# Patient Record
Sex: FEMALE | Race: WHITE | Employment: OTHER | URBAN - METROPOLITAN AREA
[De-identification: names, ages, dates, MRNs, and addresses within clinical notes are randomized per-mention and may not be internally consistent; named-entity substitution may affect disease eponyms.]

---

## 2017-02-14 ENCOUNTER — HOSPITAL ENCOUNTER (OUTPATIENT)
Dept: LAB | Age: 72
Discharge: HOME | End: 2017-02-14
Payer: COMMERCIAL

## 2017-02-14 DIAGNOSIS — J45.909 UNCOMPLICATED ASTHMA, UNSPECIFIED ASTHMA SEVERITY: Chronic | ICD-10-CM

## 2017-02-14 DIAGNOSIS — R06.09 DYSPNEA ON EXERTION: ICD-10-CM

## 2017-02-14 DIAGNOSIS — R93.89 ABNORMAL CXR: ICD-10-CM

## 2017-02-14 LAB
BASOPHILS # BLD AUTO: 0 K/UL (ref 0–0.2)
BASOPHILS # BLD: 1 % (ref 0–2)
BNP SERPL-MCNC: 24 PG/ML
D DIMER PPP FEU-MCNC: 0.45 UG/ML(FEU)
DIFFERENTIAL METHOD BLD: ABNORMAL
EOSINOPHIL # BLD: 0.6 K/UL (ref 0–0.8)
EOSINOPHIL NFR BLD: 9 % (ref 0.5–7.8)
ERYTHROCYTE [DISTWIDTH] IN BLOOD BY AUTOMATED COUNT: 13.4 % (ref 11.9–14.6)
HCT VFR BLD AUTO: 40.9 % (ref 35.8–46.3)
HGB BLD-MCNC: 13.3 G/DL (ref 11.7–15.4)
IMM GRANULOCYTES # BLD: 0 K/UL (ref 0–0.5)
IMM GRANULOCYTES NFR BLD AUTO: 0.2 % (ref 0–5)
LDH SERPL L TO P-CCNC: 233 U/L (ref 110–210)
LYMPHOCYTES # BLD AUTO: 31 % (ref 13–44)
LYMPHOCYTES # BLD: 1.9 K/UL (ref 0.5–4.6)
MCH RBC QN AUTO: 28.1 PG (ref 26.1–32.9)
MCHC RBC AUTO-ENTMCNC: 32.5 G/DL (ref 31.4–35)
MCV RBC AUTO: 86.3 FL (ref 79.6–97.8)
MONOCYTES # BLD: 0.4 K/UL (ref 0.1–1.3)
MONOCYTES NFR BLD AUTO: 7 % (ref 4–12)
NEUTS SEG # BLD: 3.3 K/UL (ref 1.7–8.2)
NEUTS SEG NFR BLD AUTO: 52 % (ref 43–78)
PLATELET # BLD AUTO: 264 K/UL (ref 150–450)
PMV BLD AUTO: 10.7 FL (ref 10.8–14.1)
RBC # BLD AUTO: 4.74 M/UL (ref 4.05–5.25)
WBC # BLD AUTO: 6.2 K/UL (ref 4.3–11.1)

## 2017-02-14 PROCEDURE — 36415 COLL VENOUS BLD VENIPUNCTURE: CPT | Performed by: INTERNAL MEDICINE

## 2017-02-14 PROCEDURE — 83615 LACTATE (LD) (LDH) ENZYME: CPT | Performed by: INTERNAL MEDICINE

## 2017-02-14 PROCEDURE — 85025 COMPLETE CBC W/AUTO DIFF WBC: CPT | Performed by: INTERNAL MEDICINE

## 2017-02-14 PROCEDURE — 83880 ASSAY OF NATRIURETIC PEPTIDE: CPT | Performed by: INTERNAL MEDICINE

## 2017-02-14 PROCEDURE — 85379 FIBRIN DEGRADATION QUANT: CPT | Performed by: INTERNAL MEDICINE

## 2017-02-20 ENCOUNTER — HOSPITAL ENCOUNTER (OUTPATIENT)
Dept: CT IMAGING | Age: 72
Discharge: HOME | End: 2017-02-20
Attending: INTERNAL MEDICINE
Payer: COMMERCIAL

## 2017-02-20 DIAGNOSIS — J45.909 UNCOMPLICATED ASTHMA, UNSPECIFIED ASTHMA SEVERITY: Chronic | ICD-10-CM

## 2017-02-20 DIAGNOSIS — R93.89 ABNORMAL CXR: ICD-10-CM

## 2017-02-20 PROCEDURE — 71250 CT THORAX DX C-: CPT

## 2017-07-24 ENCOUNTER — HOSPITAL ENCOUNTER (OUTPATIENT)
Dept: CARDIAC REHAB | Age: 72
Discharge: HOME | End: 2017-07-24

## 2017-07-24 NOTE — CARDIO/PULMONARY
2017      Dear Dr. Dex Hoang: Thank you for referring your patient, Gaby Pena (: 1945), to the Pulmonary Rehabilitation Program at [facility]. Ms. Asha Ovalle is a good candidate for the program and should see improvements with regular participation. We will be working to increase your patient's endurance and self care over the next 12 weeks. We will contact you with any issues or concerns that may arise, or you can follow your patient's progress through [facility] at any time. We will send you a final summary report when the program is completed. Again, thank you for your referral. If we can be of further assistance, please feel free to contact the Cardiopulmonary Rehab staff at [phone].     Sincerely,    Mara Cruz, RRT, RCP  Pulmonary Rehab Specialist   [facility]    CC: File

## 2017-08-01 ENCOUNTER — HOSPITAL ENCOUNTER (OUTPATIENT)
Dept: CARDIAC REHAB | Age: 72
End: 2017-08-01

## 2017-08-03 ENCOUNTER — HOSPITAL ENCOUNTER (OUTPATIENT)
Dept: CARDIAC REHAB | Age: 72
End: 2017-08-03

## 2017-08-04 ENCOUNTER — HOSPITAL ENCOUNTER (OUTPATIENT)
Dept: MAMMOGRAPHY | Age: 72
Discharge: HOME | End: 2017-08-04
Attending: FAMILY MEDICINE
Payer: COMMERCIAL

## 2017-08-04 DIAGNOSIS — Z12.31 VISIT FOR SCREENING MAMMOGRAM: ICD-10-CM

## 2017-08-04 PROCEDURE — 77067 SCR MAMMO BI INCL CAD: CPT

## 2017-08-08 ENCOUNTER — APPOINTMENT (OUTPATIENT)
Dept: CARDIAC REHAB | Age: 72
End: 2017-08-08

## 2017-08-10 ENCOUNTER — HOSPITAL ENCOUNTER (OUTPATIENT)
Dept: CARDIAC REHAB | Age: 72
End: 2017-08-10

## 2017-08-15 ENCOUNTER — APPOINTMENT (OUTPATIENT)
Dept: CARDIAC REHAB | Age: 72
End: 2017-08-15

## 2017-08-17 ENCOUNTER — APPOINTMENT (OUTPATIENT)
Dept: CARDIAC REHAB | Age: 72
End: 2017-08-17

## 2017-08-22 ENCOUNTER — APPOINTMENT (OUTPATIENT)
Dept: CARDIAC REHAB | Age: 72
End: 2017-08-22

## 2017-08-24 ENCOUNTER — APPOINTMENT (OUTPATIENT)
Dept: CARDIAC REHAB | Age: 72
End: 2017-08-24

## 2017-08-29 ENCOUNTER — APPOINTMENT (OUTPATIENT)
Dept: CARDIAC REHAB | Age: 72
End: 2017-08-29

## 2017-08-31 ENCOUNTER — APPOINTMENT (OUTPATIENT)
Dept: CARDIAC REHAB | Age: 72
End: 2017-08-31

## 2017-09-05 ENCOUNTER — HOSPITAL ENCOUNTER (OUTPATIENT)
Dept: CARDIAC REHAB | Age: 72
End: 2017-09-05

## 2017-09-07 ENCOUNTER — APPOINTMENT (OUTPATIENT)
Dept: CARDIAC REHAB | Age: 72
End: 2017-09-07

## 2017-09-12 ENCOUNTER — APPOINTMENT (OUTPATIENT)
Dept: CARDIAC REHAB | Age: 72
End: 2017-09-12

## 2017-09-14 ENCOUNTER — APPOINTMENT (OUTPATIENT)
Dept: CARDIAC REHAB | Age: 72
End: 2017-09-14

## 2017-09-15 ENCOUNTER — HOSPITAL ENCOUNTER (OUTPATIENT)
Dept: CARDIAC REHAB | Age: 72
End: 2017-09-15

## 2017-09-19 ENCOUNTER — APPOINTMENT (OUTPATIENT)
Dept: CARDIAC REHAB | Age: 72
End: 2017-09-19

## 2017-09-21 ENCOUNTER — APPOINTMENT (OUTPATIENT)
Dept: CARDIAC REHAB | Age: 72
End: 2017-09-21

## 2017-09-26 ENCOUNTER — APPOINTMENT (OUTPATIENT)
Dept: CARDIAC REHAB | Age: 72
End: 2017-09-26

## 2017-09-28 ENCOUNTER — HOSPITAL ENCOUNTER (OUTPATIENT)
Dept: CARDIAC REHAB | Age: 72
End: 2017-09-28

## 2017-10-03 ENCOUNTER — APPOINTMENT (OUTPATIENT)
Dept: CARDIAC REHAB | Age: 72
End: 2017-10-03

## 2017-10-05 ENCOUNTER — APPOINTMENT (OUTPATIENT)
Dept: CARDIAC REHAB | Age: 72
End: 2017-10-05

## 2017-10-10 ENCOUNTER — APPOINTMENT (OUTPATIENT)
Dept: CARDIAC REHAB | Age: 72
End: 2017-10-10

## 2017-10-12 ENCOUNTER — APPOINTMENT (OUTPATIENT)
Dept: CARDIAC REHAB | Age: 72
End: 2017-10-12

## 2017-10-17 ENCOUNTER — APPOINTMENT (OUTPATIENT)
Dept: CARDIAC REHAB | Age: 72
End: 2017-10-17

## 2017-10-19 ENCOUNTER — APPOINTMENT (OUTPATIENT)
Dept: CARDIAC REHAB | Age: 72
End: 2017-10-19

## 2017-10-24 ENCOUNTER — APPOINTMENT (OUTPATIENT)
Dept: CARDIAC REHAB | Age: 72
End: 2017-10-24

## 2017-10-26 ENCOUNTER — APPOINTMENT (OUTPATIENT)
Dept: CARDIAC REHAB | Age: 72
End: 2017-10-26

## 2017-10-31 ENCOUNTER — APPOINTMENT (OUTPATIENT)
Dept: CARDIAC REHAB | Age: 72
End: 2017-10-31

## 2018-06-13 PROBLEM — E66.01 SEVERE OBESITY (BMI 35.0-39.9): Status: ACTIVE | Noted: 2018-06-13

## 2018-08-06 ENCOUNTER — HOSPITAL ENCOUNTER (OUTPATIENT)
Dept: MAMMOGRAPHY | Age: 73
Discharge: HOME | End: 2018-08-06
Attending: FAMILY MEDICINE
Payer: COMMERCIAL

## 2018-08-06 DIAGNOSIS — Z12.31 VISIT FOR SCREENING MAMMOGRAM: ICD-10-CM

## 2018-08-06 PROCEDURE — 77067 SCR MAMMO BI INCL CAD: CPT

## 2018-08-08 ENCOUNTER — HOSPITAL ENCOUNTER (OUTPATIENT)
Dept: MAMMOGRAPHY | Age: 73
Discharge: HOME | End: 2018-08-08
Attending: FAMILY MEDICINE
Payer: COMMERCIAL

## 2018-08-08 DIAGNOSIS — M89.9 DISORDER OF BONE AND CARTILAGE: ICD-10-CM

## 2018-08-08 DIAGNOSIS — M94.9 DISORDER OF BONE AND CARTILAGE: ICD-10-CM

## 2018-08-08 PROCEDURE — 77080 DXA BONE DENSITY AXIAL: CPT

## 2018-09-12 ENCOUNTER — HOSPITAL ENCOUNTER (OUTPATIENT)
Dept: CT IMAGING | Age: 73
Discharge: HOME | End: 2018-09-12
Attending: FAMILY MEDICINE
Payer: COMMERCIAL

## 2018-09-12 DIAGNOSIS — R10.9 ABDOMINAL PAIN, UNSPECIFIED ABDOMINAL LOCATION: ICD-10-CM

## 2018-09-12 LAB — CREAT BLD-MCNC: 0.7 MG/DL (ref 0.8–1.5)

## 2018-09-12 PROCEDURE — 74177 CT ABD & PELVIS W/CONTRAST: CPT

## 2018-09-12 PROCEDURE — 74011636320 HC RX REV CODE- 636/320: Performed by: FAMILY MEDICINE

## 2018-09-12 PROCEDURE — 82565 ASSAY OF CREATININE: CPT

## 2018-09-12 PROCEDURE — 74011000258 HC RX REV CODE- 258: Performed by: FAMILY MEDICINE

## 2018-09-12 RX ORDER — SODIUM CHLORIDE 0.9 % (FLUSH) 0.9 %
10 SYRINGE (ML) INJECTION
Status: COMPLETED | OUTPATIENT
Start: 2018-09-12 | End: 2018-09-12

## 2018-09-12 RX ADMIN — IOPAMIDOL 100 ML: 755 INJECTION, SOLUTION INTRAVENOUS at 13:54

## 2018-09-12 RX ADMIN — SODIUM CHLORIDE 100 ML: 900 INJECTION, SOLUTION INTRAVENOUS at 13:54

## 2018-09-12 RX ADMIN — Medication 10 ML: at 13:54

## 2018-09-12 RX ADMIN — DIATRIZOATE MEGLUMINE AND DIATRIZOATE SODIUM 15 ML: 660; 100 LIQUID ORAL; RECTAL at 13:54

## 2018-11-07 ENCOUNTER — HOSPITAL ENCOUNTER (EMERGENCY)
Age: 73
Discharge: HOME | End: 2018-11-07
Attending: EMERGENCY MEDICINE
Payer: COMMERCIAL

## 2018-11-07 ENCOUNTER — APPOINTMENT (OUTPATIENT)
Dept: GENERAL RADIOLOGY | Age: 73
End: 2018-11-07
Attending: EMERGENCY MEDICINE
Payer: COMMERCIAL

## 2018-11-07 VITALS
TEMPERATURE: 98.5 F | BODY MASS INDEX: 31.15 KG/M2 | SYSTOLIC BLOOD PRESSURE: 148 MMHG | OXYGEN SATURATION: 98 % | WEIGHT: 165 LBS | HEART RATE: 80 BPM | RESPIRATION RATE: 16 BRPM | DIASTOLIC BLOOD PRESSURE: 80 MMHG | HEIGHT: 61 IN

## 2018-11-07 DIAGNOSIS — S52.502A CLOSED FRACTURE OF DISTAL END OF LEFT RADIUS, UNSPECIFIED FRACTURE MORPHOLOGY, INITIAL ENCOUNTER: Primary | ICD-10-CM

## 2018-11-07 PROCEDURE — 73030 X-RAY EXAM OF SHOULDER: CPT

## 2018-11-07 PROCEDURE — 99283 EMERGENCY DEPT VISIT LOW MDM: CPT | Performed by: EMERGENCY MEDICINE

## 2018-11-07 PROCEDURE — 73110 X-RAY EXAM OF WRIST: CPT

## 2018-11-07 PROCEDURE — 73080 X-RAY EXAM OF ELBOW: CPT

## 2018-11-07 NOTE — ED PROVIDER NOTES
68year old lady presents with concerns about falling onto her outstretched left hand earlier this afternoon. She noted that it continued to be swollen and a little painful since she wanted to get evaluated. She also has some mild pain in her elbow and left shoulder. She denies any other injury. Overall she rates her pain as an 8 out of 10. She took some Ultram at home without any significant relief. Elements of this note were created using speech recognition software. As such, errors of speech recognition may be present. Past Medical History:  
Diagnosis Date  Asthma   
 managed with medication  Breast CA ([address] 1994  
 right side, mastectomy only  Difficult intubation   
 pt reports was told this after 1995 breast surgery- has not had issue/problems with 2014 knee scope or 2015 lap sheree  GERD (gastroesophageal reflux disease) OTC prn controls  Hiatal hernia  High cholesterol   
 pt refuses to take her medication  History of skin cancer   
 removed basal cell right forearm  Ill-defined condition   
 hiatal hernia  Obesity (BMI 30-39. 9) BMI 34  
 Unspecified sleep apnea   
 non compliant with CPAP Past Surgical History:  
Procedure Laterality Date  BREAST SURGERY PROCEDURE UNLISTED Right   
 right breast and 26 lymph nodes removed  HX CHOLECYSTECTOMY  1/2015 Dr Víctor Chu  HX COLONOSCOPY    
 HX ENDOSCOPY    
 HX KNEE ARTHROSCOPY  5/2014  HX LUMBAR FUSION  2011 L4-L5  HX MODIFIED RADICAL MASTECTOMY Right 1995  
 right side, for cancer [address] [facility] [address] Bilateral [address] Family History:  
Problem Relation Age of Onset  Heart Disease Father  Cancer Father   
     prostate  Stroke Mother  Breast Cancer Neg Hx Social History Socioeconomic History  Marital status:  Spouse name: Not on file  Number of children: Not on file  Years of education: Not on file  Highest education level: Not on file Social Needs  Financial resource strain: Not on file  Food insecurity - worry: Not on file  Food insecurity - inability: Not on file  Transportation needs - medical: Not on file  Transportation needs - non-medical: Not on file Occupational History  Not on file Tobacco Use  Smoking status: Never Smoker  Smokeless tobacco: Never Used Substance and Sexual Activity  Alcohol use: No  
 Drug use: No  
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  
 1 cat ALLERGIES: Milk and Peanut Review of Systems Constitutional: Negative for chills and fever. Musculoskeletal: Positive for arthralgias and joint swelling. Skin: Negative for color change and wound. Neurological: Negative for dizziness, syncope and headaches. Vitals:  
 11/07/18 1410 BP: 165/83 Pulse: 87 Resp: 17 Temp: 98.7 °F (37.1 °C) SpO2: 94% Weight: 74.8 kg (165 lb) Height: 5' 1\" (1.549 m) Physical Exam  
Constitutional: She is oriented to person, place, and time. She appears well-developed and well-nourished. HENT:  
Head: Normocephalic and atraumatic. Musculoskeletal:  
Patient has no bony tenderness in her left shoulder but she does have some mild crepitus. Of note, she says that is usually there. Patient has some tenderness over her olecranon in her left elbow. No ligament instability in the elbow. She has mild swelling to her distal radius. She has full range of motion in her fingers without pain. Neurological: She is alert and oriented to person, place, and time. Skin: Skin is warm and dry. Nursing note and vitals reviewed. MDM Number of Diagnoses or Management Options Diagnosis management comments: X-ray shows a minimally displaced distal left radius fracture I discussed the case with Dr. Bianka Kendall advised a sugar tong splint. Patient did not want anything going over her elbow so I will put her in a Velcro wrist brace. Procedures

## 2018-11-07 NOTE — ED NOTES
I have reviewed discharge instructions with the patient. The patient verbalized understanding. Patient left ED via Discharge Method: ambulatory to Home with family. Opportunity for questions and clarification provided. Patient given 0 scripts. To continue your aftercare when you leave the hospital, you may receive an automated call from our care team to check in on how you are doing. This is a free service and part of our promise to provide the best care and service to meet your aftercare needs.  If you have questions, or wish to unsubscribe from this service please call [phone]. Thank you for Choosing our [facility] Emergency Department.

## 2018-11-07 NOTE — DISCHARGE INSTRUCTIONS
Return with any fevers, increasing pain, worsening symptoms, or distal concerns. Wear the brace until cleared by orthopedics. Follow-up with the orthopedist for further evaluation. Please call their office tomorrow to confirm an appointment.

## 2019-03-22 PROBLEM — E66.01 SEVERE OBESITY (BMI 35.0-39.9): Status: RESOLVED | Noted: 2018-06-13 | Resolved: 2019-03-22

## 2019-05-28 ENCOUNTER — HOSPITAL ENCOUNTER (OUTPATIENT)
Dept: PET IMAGING | Age: 74
Discharge: HOME | End: 2019-05-28
Payer: COMMERCIAL

## 2019-05-28 ENCOUNTER — HOSPITAL ENCOUNTER (OUTPATIENT)
Dept: LAB | Age: 74
Discharge: HOME | End: 2019-05-28
Payer: COMMERCIAL

## 2019-05-28 DIAGNOSIS — C80.1 CLEAR CELL CARCINOMA (HCC): ICD-10-CM

## 2019-05-28 DIAGNOSIS — C56.2 MALIGNANT NEOPLASM OF LEFT OVARY (HCC): ICD-10-CM

## 2019-05-28 LAB — CANCER AG125 SERPL-ACNC: 3 U/ML (ref 1.5–35)

## 2019-05-28 PROCEDURE — A9552 F18 FDG: HCPCS

## 2019-05-28 PROCEDURE — 86304 IMMUNOASSAY TUMOR CA 125: CPT

## 2019-05-28 PROCEDURE — 36415 COLL VENOUS BLD VENIPUNCTURE: CPT

## 2019-05-28 PROCEDURE — 74011636320 HC RX REV CODE- 636/320: Performed by: OBSTETRICS & GYNECOLOGY

## 2019-05-28 RX ORDER — SODIUM CHLORIDE 0.9 % (FLUSH) 0.9 %
10 SYRINGE (ML) INJECTION
Status: COMPLETED | OUTPATIENT
Start: 2019-05-28 | End: 2019-05-28

## 2019-05-28 RX ADMIN — Medication 10 ML: at 11:30

## 2019-05-28 RX ADMIN — DIATRIZOATE MEGLUMINE AND DIATRIZOATE SODIUM 10 ML: 660; 100 LIQUID ORAL; RECTAL at 11:30

## 2019-09-09 ENCOUNTER — HOSPITAL ENCOUNTER (OUTPATIENT)
Dept: MAMMOGRAPHY | Age: 74
Discharge: HOME | End: 2019-09-09
Attending: FAMILY MEDICINE

## 2019-09-09 DIAGNOSIS — Z12.31 VISIT FOR SCREENING MAMMOGRAM: ICD-10-CM

## 2019-09-13 ENCOUNTER — HOSPITAL ENCOUNTER (OUTPATIENT)
Dept: LAB | Age: 74
Discharge: HOME | End: 2019-09-13
Payer: COMMERCIAL

## 2019-09-13 DIAGNOSIS — C56.9 MALIGNANT NEOPLASM OF OVARY, UNSPECIFIED LATERALITY (HCC): ICD-10-CM

## 2019-09-13 LAB — CANCER AG125 SERPL-ACNC: 2 U/ML (ref 1.5–35)

## 2019-09-13 PROCEDURE — 36415 COLL VENOUS BLD VENIPUNCTURE: CPT

## 2019-09-13 PROCEDURE — 86304 IMMUNOASSAY TUMOR CA 125: CPT

## 2019-11-27 ENCOUNTER — APPOINTMENT (RX ONLY)
Dept: URBAN - METROPOLITAN AREA CLINIC 349 | Facility: CLINIC | Age: 74
Setting detail: DERMATOLOGY
End: 2019-11-27

## 2019-11-27 DIAGNOSIS — L50.1 IDIOPATHIC URTICARIA: ICD-10-CM

## 2019-11-27 PROBLEM — L29.8 OTHER PRURITUS: Status: ACTIVE | Noted: 2019-11-27

## 2019-11-27 PROBLEM — D48.5 NEOPLASM OF UNCERTAIN BEHAVIOR OF SKIN: Status: ACTIVE | Noted: 2019-11-27

## 2019-11-27 PROBLEM — Z85.3 PERSONAL HISTORY OF MALIGNANT NEOPLASM OF BREAST: Status: ACTIVE | Noted: 2019-11-27

## 2019-11-27 PROCEDURE — ? TREATMENT REGIMEN

## 2019-11-27 PROCEDURE — 99213 OFFICE O/P EST LOW 20 MIN: CPT

## 2019-11-27 PROCEDURE — ? COUNSELING

## 2019-11-27 ASSESSMENT — LOCATION DETAILED DESCRIPTION DERM
LOCATION DETAILED: RIGHT INFERIOR MEDIAL MIDBACK
LOCATION DETAILED: LEFT ANTERIOR SHOULDER
LOCATION DETAILED: LEFT VENTRAL DISTAL FOREARM
LOCATION DETAILED: RIGHT VENTRAL DISTAL FOREARM
LOCATION DETAILED: RIGHT ANTERIOR PROXIMAL THIGH

## 2019-11-27 ASSESSMENT — LOCATION SIMPLE DESCRIPTION DERM
LOCATION SIMPLE: LEFT FOREARM
LOCATION SIMPLE: RIGHT LOWER BACK
LOCATION SIMPLE: LEFT SHOULDER
LOCATION SIMPLE: RIGHT FOREARM
LOCATION SIMPLE: RIGHT THIGH

## 2019-11-27 ASSESSMENT — LOCATION ZONE DERM
LOCATION ZONE: LEG
LOCATION ZONE: ARM
LOCATION ZONE: TRUNK

## 2019-11-27 NOTE — HPI: RASH
What Type Of Note Output Would You Prefer (Optional)?: Bullet Format
How Severe Is Your Rash?: moderate
Is This A New Presentation, Or A Follow-Up?: Rash
Additional History: Awoke yesterday a.m. with hive like areas on neck, arms and back.  Used some alcohol and went away.  Has a cat in house x 5 years.  History of ovarian cancer in 2/2019.  Takes singular daily.  History of asthma. History of night sweats.

## 2019-11-27 NOTE — PROCEDURE: TREATMENT REGIMEN
Plan: Also declined IM Kenalog
Detail Level: Zone
Discontinue Regimen: Alcohol
Continue Regimen: Singulair
Otc Regimen: Cerave
Initiate Treatment: Dove soap \\nZyrtec or Claritin two daily x 1 month\\nTriamcinolone cream twice daily x 2 wks. Patient declined topical.

## 2020-02-10 ENCOUNTER — HOSPITAL ENCOUNTER (OUTPATIENT)
Dept: CT IMAGING | Age: 75
Discharge: HOME | End: 2020-02-10

## 2020-02-10 DIAGNOSIS — C56.9 MALIGNANT NEOPLASM OF OVARY, UNSPECIFIED LATERALITY (HCC): ICD-10-CM

## 2020-02-10 DIAGNOSIS — C80.1 CLEAR CELL CARCINOMA (HCC): ICD-10-CM

## 2020-02-10 DIAGNOSIS — R10.2 PELVIC PAIN: ICD-10-CM

## 2020-02-10 RX ORDER — SODIUM CHLORIDE 0.9 % (FLUSH) 0.9 %
10 SYRINGE (ML) INJECTION
Status: COMPLETED | OUTPATIENT
Start: 2020-02-10 | End: 2020-02-10

## 2020-02-10 RX ADMIN — Medication 10 ML: at 14:58

## 2020-02-14 ENCOUNTER — HOSPITAL ENCOUNTER (OUTPATIENT)
Dept: LAB | Age: 75
Discharge: HOME | End: 2020-02-14
Payer: COMMERCIAL

## 2020-02-14 DIAGNOSIS — C80.1 CLEAR CELL CARCINOMA (HCC): ICD-10-CM

## 2020-02-14 DIAGNOSIS — C56.1 MALIGNANT NEOPLASM OF RIGHT OVARY (HCC): ICD-10-CM

## 2020-02-14 LAB — CANCER AG125 SERPL-ACNC: 3 U/ML (ref 1.5–35)

## 2020-02-14 PROCEDURE — 36415 COLL VENOUS BLD VENIPUNCTURE: CPT

## 2020-02-14 PROCEDURE — 86304 IMMUNOASSAY TUMOR CA 125: CPT

## 2020-12-04 ENCOUNTER — HOSPITAL ENCOUNTER (OUTPATIENT)
Dept: CT IMAGING | Age: 75
Discharge: HOME | End: 2020-12-04
Attending: OBSTETRICS & GYNECOLOGY

## 2020-12-04 DIAGNOSIS — C56.1 MALIGNANT NEOPLASM OF RIGHT OVARY (HCC): ICD-10-CM

## 2020-12-04 RX ORDER — SODIUM CHLORIDE 0.9 % (FLUSH) 0.9 %
10 SYRINGE (ML) INJECTION
Status: COMPLETED | OUTPATIENT
Start: 2020-12-04 | End: 2020-12-04

## 2020-12-04 RX ADMIN — Medication 10 ML: at 14:16

## 2022-02-08 ENCOUNTER — HOSPITAL ENCOUNTER (OUTPATIENT)
Dept: CT IMAGING | Age: 77
Discharge: HOME | End: 2022-02-08
Attending: OBSTETRICS & GYNECOLOGY
Payer: COMMERCIAL

## 2022-02-08 DIAGNOSIS — C80.1 CLEAR CELL CARCINOMA (HCC): ICD-10-CM

## 2022-02-08 DIAGNOSIS — C56.1 MALIGNANT NEOPLASM OF RIGHT OVARY (HCC): ICD-10-CM

## 2022-02-08 LAB — CREAT BLD-MCNC: 0.65 MG/DL (ref 0.8–1.5)

## 2022-02-08 PROCEDURE — 74011000636 HC RX REV CODE- 636: Performed by: OBSTETRICS & GYNECOLOGY

## 2022-02-08 PROCEDURE — 82565 ASSAY OF CREATININE: CPT

## 2022-02-08 PROCEDURE — 74011000258 HC RX REV CODE- 258: Performed by: OBSTETRICS & GYNECOLOGY

## 2022-02-08 PROCEDURE — 74177 CT ABD & PELVIS W/CONTRAST: CPT

## 2022-02-08 RX ORDER — SODIUM CHLORIDE 0.9 % (FLUSH) 0.9 %
10 SYRINGE (ML) INJECTION
Status: COMPLETED | OUTPATIENT
Start: 2022-02-08 | End: 2022-02-08

## 2022-02-08 RX ADMIN — DIATRIZOATE MEGLUMINE AND DIATRIZOATE SODIUM 15 ML: 660; 100 LIQUID ORAL; RECTAL at 12:06

## 2022-02-08 RX ADMIN — IOPAMIDOL 100 ML: 755 INJECTION, SOLUTION INTRAVENOUS at 12:06

## 2022-02-08 RX ADMIN — Medication 10 ML: at 12:07

## 2022-02-08 RX ADMIN — SODIUM CHLORIDE 100 ML: 9 INJECTION, SOLUTION INTRAVENOUS at 12:07

## 2022-05-03 PROBLEM — F33.9 MAJOR DEPRESSIVE DISORDER, RECURRENT, UNSPECIFIED (HCC): Status: ACTIVE | Noted: 2022-05-03

## 2022-05-03 PROBLEM — F33.0 MAJOR DEPRESSIVE DISORDER, RECURRENT, MILD (HCC): Status: ACTIVE | Noted: 2022-05-03

## 2022-05-03 PROBLEM — F33.1 MAJOR DEPRESSIVE DISORDER, RECURRENT, MODERATE (HCC): Status: ACTIVE | Noted: 2022-05-03

## 2022-05-25 ENCOUNTER — TELEPHONE (OUTPATIENT)
Dept: BEHAVIORAL/MENTAL HEALTH CLINIC | Age: 77
End: 2022-05-25

## 2022-05-27 RX ORDER — LEVOMEFOLATE CALCIUM 7.5 MG TABLET
7.5 TABLET DAILY
Qty: 30 TABLET | Refills: 2 | Status: SHIPPED | OUTPATIENT
Start: 2022-05-27 | End: 2022-08-08 | Stop reason: SDUPTHER

## 2022-05-31 ENCOUNTER — TELEPHONE (OUTPATIENT)
Dept: PRIMARY CARE CLINIC | Facility: CLINIC | Age: 77
End: 2022-05-31

## 2022-06-01 NOTE — TELEPHONE ENCOUNTER
Returned call to patient. This medication can be ordered online for cheaper than can be purchased at the pharmacy. Patient advised that if insurance will not pay for it she doesn't want it. Patient questioned the EOB she received from insurance on the [employer] testing. Advised patient that she will need to reach out to [employer] directly to discuss this and offered the phone number. Patient stated that she had the number and hung up.

## 2022-06-02 ENCOUNTER — TELEPHONE (OUTPATIENT)
Dept: PULMONOLOGY | Age: 77
End: 2022-06-02

## 2022-06-03 ENCOUNTER — TELEPHONE (OUTPATIENT)
Dept: PULMONOLOGY | Age: 77
End: 2022-06-03

## 2022-06-06 RX ORDER — MONTELUKAST SODIUM 10 MG/1
TABLET ORAL
Qty: 90 TABLET | Refills: 3 | Status: SHIPPED | OUTPATIENT
Start: 2022-06-06 | End: 2022-10-11 | Stop reason: SDUPTHER

## 2022-06-06 RX ORDER — FLUTICASONE PROPIONATE AND SALMETEROL 500; 50 UG/1; UG/1
POWDER RESPIRATORY (INHALATION)
Qty: 60 EACH | Refills: 3 | Status: SHIPPED | OUTPATIENT
Start: 2022-06-06

## 2022-06-06 RX ORDER — TIOTROPIUM BROMIDE INHALATION SPRAY 3.12 UG/1
SPRAY, METERED RESPIRATORY (INHALATION)
Qty: 3 EACH | Refills: 3 | Status: SHIPPED | OUTPATIENT
Start: 2022-06-06

## 2022-06-15 RX ORDER — BUSPIRONE HYDROCHLORIDE 10 MG/1
TABLET ORAL
Qty: 90 TABLET | Refills: 3 | OUTPATIENT
Start: 2022-06-15

## 2022-06-15 RX ORDER — BUPROPION HYDROCHLORIDE 150 MG/1
TABLET ORAL
Qty: 90 TABLET | Refills: 1 | OUTPATIENT
Start: 2022-06-15

## 2022-06-30 ENCOUNTER — TELEPHONE (OUTPATIENT)
Dept: ORTHOPEDIC SURGERY | Age: 77
End: 2022-06-30

## 2022-06-30 NOTE — TELEPHONE ENCOUNTER
Pt called demetri reese called her with mri results. pt said she has issue leaving home   Please call

## 2022-07-07 ENCOUNTER — OFFICE VISIT (OUTPATIENT)
Dept: ORTHOPEDIC SURGERY | Age: 77
End: 2022-07-07
Payer: MEDICARE

## 2022-07-07 DIAGNOSIS — M51.16 LUMBAR DISC HERNIATION WITH RADICULOPATHY: ICD-10-CM

## 2022-07-07 DIAGNOSIS — M48.062 SPINAL STENOSIS, LUMBAR REGION WITH NEUROGENIC CLAUDICATION: Primary | ICD-10-CM

## 2022-07-07 PROCEDURE — 99447 NTRPROF PH1/NTRNET/EHR 11-20: CPT | Performed by: NURSE PRACTITIONER

## 2022-07-07 NOTE — PROGRESS NOTES
Padilla Brown is a 69 y.o. female evaluated via telephone on 7/7/2022 for back and anterior leg pain. Assessment & Plan   1. Spinal stenosis, lumbar region with neurogenic claudication  2. Lumbar disc herniation with radiculopathy    Return for Surgery- refer to YUE Russ        77y.o. year old  female who presents with a multi month history of pain in the lower back and sacral area rating down into the anterior portion of her legs to her knees. She does have right groin pain as well. Right  is worse than left and leg pain. Patient has a history of L4-5 fusion in 2011. She has known disc degeneration at L5-S1. She reports she is had multiple falls. She is not interested in any steroid injections. She stated \"is not there is surgery to  fix it\". Patient has tried a home exercise program, her 's pain medication, Advil and Tylenol. At last visit patient was referred for an MRI of the lumbar spine. She requested a phone visit today due to the fact of she has some social phobias. MRI of the lumbar spine images independently reviewed:    Patient has a bilobed disc herniation at L2-3 causing advanced spinal stenosis. I reviewed her MRI findings with her. She is not interested in injection she wants to fix this. She does not want to go back to Dr. Jacob Cristobal in [place]. I will go ahead and refer her to Dr. Diann Grady for further surgical evaluation. Total Time: minutes: 11-20 minutes     Lorenza Long was evaluated through a synchronous (real-time) audio only encounter. Patient identification was verified at the start of the visit. She (or guardian if applicable) is aware that this is a billable service, which includes applicable co-pays. This visit was conducted with patient's (and/or legal guardian's) verbal consent. She has not had a related appointment within my department in the past 7 days or scheduled within the next 24 hours.    The patient was located at Home: [address] [id number]. The provider was located at [facility] ([address] [place] [address]): [address],  [facility] [address]      Leela Wren, APRN - CNP

## 2022-07-18 RX ORDER — BUSPIRONE HYDROCHLORIDE 10 MG/1
TABLET ORAL
Qty: 90 TABLET | Refills: 3 | OUTPATIENT
Start: 2022-07-18

## 2022-08-08 ENCOUNTER — TELEMEDICINE (OUTPATIENT)
Dept: BEHAVIORAL/MENTAL HEALTH CLINIC | Age: 77
End: 2022-08-08
Payer: MEDICARE

## 2022-08-08 DIAGNOSIS — F41.8 ANXIETY WITH OBSESSIONAL FEATURES: ICD-10-CM

## 2022-08-08 DIAGNOSIS — F41.1 GENERALIZED ANXIETY DISORDER: ICD-10-CM

## 2022-08-08 DIAGNOSIS — F51.05 INSOMNIA DUE TO MENTAL DISORDER: ICD-10-CM

## 2022-08-08 DIAGNOSIS — F33.41 RECURRENT MAJOR DEPRESSIVE DISORDER, IN PARTIAL REMISSION (HCC): Primary | ICD-10-CM

## 2022-08-08 PROCEDURE — 99442 PR PHYS/QHP TELEPHONE EVALUATION 11-20 MIN: CPT | Performed by: PSYCHIATRY & NEUROLOGY

## 2022-08-08 RX ORDER — LEVOMEFOLATE CALCIUM 7.5 MG TABLET
7.5 TABLET DAILY
Qty: 30 TABLET | Refills: 2 | Status: SHIPPED | OUTPATIENT
Start: 2022-08-08

## 2022-08-08 RX ORDER — BUPROPION HYDROCHLORIDE 300 MG/1
300 TABLET ORAL EVERY MORNING
Qty: 30 TABLET | Refills: 2 | Status: SHIPPED | OUTPATIENT
Start: 2022-08-08 | End: 2022-10-31 | Stop reason: SDUPTHER

## 2022-08-08 RX ORDER — SERTRALINE HYDROCHLORIDE 100 MG/1
100 TABLET, FILM COATED ORAL DAILY
Qty: 30 TABLET | Refills: 2 | Status: SHIPPED | OUTPATIENT
Start: 2022-08-08 | End: 2022-10-31 | Stop reason: SDUPTHER

## 2022-08-08 RX ORDER — BUSPIRONE HYDROCHLORIDE 15 MG/1
15 TABLET ORAL 3 TIMES DAILY
Qty: 30 TABLET | Refills: 2 | Status: SHIPPED | OUTPATIENT
Start: 2022-08-08 | End: 2022-10-31 | Stop reason: SDUPTHER

## 2022-08-08 ASSESSMENT — PATIENT HEALTH QUESTIONNAIRE - PHQ9
6. FEELING BAD ABOUT YOURSELF - OR THAT YOU ARE A FAILURE OR HAVE LET YOURSELF OR YOUR FAMILY DOWN: 0
SUM OF ALL RESPONSES TO PHQ QUESTIONS 1-9: 4
1. LITTLE INTEREST OR PLEASURE IN DOING THINGS: 3
8. MOVING OR SPEAKING SO SLOWLY THAT OTHER PEOPLE COULD HAVE NOTICED. OR THE OPPOSITE, BEING SO FIGETY OR RESTLESS THAT YOU HAVE BEEN MOVING AROUND A LOT MORE THAN USUAL: 0
7. TROUBLE CONCENTRATING ON THINGS, SUCH AS READING THE NEWSPAPER OR WATCHING TELEVISION: 0
3. TROUBLE FALLING OR STAYING ASLEEP: 0
SUM OF ALL RESPONSES TO PHQ QUESTIONS 1-9: 4
4. FEELING TIRED OR HAVING LITTLE ENERGY: 1
10. IF YOU CHECKED OFF ANY PROBLEMS, HOW DIFFICULT HAVE THESE PROBLEMS MADE IT FOR YOU TO DO YOUR WORK, TAKE CARE OF THINGS AT HOME, OR GET ALONG WITH OTHER PEOPLE: 0
9. THOUGHTS THAT YOU WOULD BE BETTER OFF DEAD, OR OF HURTING YOURSELF: 0
SUM OF ALL RESPONSES TO PHQ QUESTIONS 1-9: 4
5. POOR APPETITE OR OVEREATING: 0
SUM OF ALL RESPONSES TO PHQ QUESTIONS 1-9: 4

## 2022-08-08 ASSESSMENT — ANXIETY QUESTIONNAIRES
4. TROUBLE RELAXING: 0
6. BECOMING EASILY ANNOYED OR IRRITABLE: 0
IF YOU CHECKED OFF ANY PROBLEMS ON THIS QUESTIONNAIRE, HOW DIFFICULT HAVE THESE PROBLEMS MADE IT FOR YOU TO DO YOUR WORK, TAKE CARE OF THINGS AT HOME, OR GET ALONG WITH OTHER PEOPLE: NOT DIFFICULT AT ALL
2. NOT BEING ABLE TO STOP OR CONTROL WORRYING: 2
1. FEELING NERVOUS, ANXIOUS, OR ON EDGE: 0
7. FEELING AFRAID AS IF SOMETHING AWFUL MIGHT HAPPEN: 1
GAD7 TOTAL SCORE: 6
3. WORRYING TOO MUCH ABOUT DIFFERENT THINGS: 3
5. BEING SO RESTLESS THAT IT IS HARD TO SIT STILL: 0

## 2022-08-08 NOTE — PROGRESS NOTES
Patient:  Syed Agrawal  Age:  69 y.o.  :  1945     SEX:  female MRN:  [id number]     RACE: White (non-)     SEEN:  [x]  PATIENT  []  SPOUSE []  OTHER:              PHQ-9  2022 2022 10/21/2021   Little interest or pleasure in doing things 3 - -   Little interest or pleasure in doing things - 0 3   Trouble falling or staying asleep, or sleeping too much 0 - -   Trouble falling or staying asleep, or sleeping too much - 0 3   Feeling tired or having little energy 1 - -   Feeling tired or having little energy - 0 3   Poor appetite or overeating 0 - -   Poor appetite, weight loss, or overeating - 0 0   Feeling bad about yourself - or that you are a failure or have let yourself or your family down 0 - -   Feeling bad about yourself - or that you are a failure or have let yourself or your family down - 0 3   Trouble concentrating on things, such as reading the newspaper or watching television 0 - -   Trouble concentrating on things such as school, work, reading, or watching TV - 0 3   Moving or speaking so slowly that other people could have noticed. Or the opposite - being so fidgety or restless that you have been moving around a lot more than usual 0 - -   Moving or speaking so slowly that other people could have noticed; or the opposite being so fidgety that others notice - 0 1   Thoughts that you would be better off dead, or of hurting yourself in some way 0 - -   Thoughts of being better off dead, or hurting yourself in some way - 0 0   Total Score PHQ 2 - 0 6   PHQ-9 Total Score 4 - -   PHQ 9 Score - 0 19   If you checked off any problems, how difficult have these problems made it for you to do your work, take care of things at home, or get along with other people?  0 - -   How difficult have these problems made it for you to do your work, take care of your home and get along with others - Not difficult at all Extremely difficult       JAVAD-7 SCREENING 2022 10/21/2021   Feeling nervous, anxious, or on edge Not at all - -   Not being able to stop or control worrying More than half the days - -   Worrying too much about different things Nearly every day - -   Trouble relaxing Not at all - -   Being so restless that it is hard to sit still Not at all - -   Becoming easily annoyed or irritable Not at all - -   Feeling afraid as if something awful might happen Several days - -   JAVAD-7 Total Score 6 - -   How difficult have these problems made it for you to do your work, take care of things at home, or get along with other people? Not difficult at all Not difficult at all Extremely difficult   Feeling nervous, anxious, or on edge - Nearly every day Nearly every day   JAVAD-7 Total Score - 7 12        I was at home while conducting this encounter. Consent:  She and/or her healthcare decision maker is aware that this patient-initiated Telehealth encounter is a billable service, with coverage as determined by her insurance carrier. She is aware that she may receive a bill and has provided verbal consent to proceed: YesPatient identification was verified, and a caregiver was present when appropriate. The patient was located in a state where the provider was credentialed to provide care. This virtual visit was conducted telephone encounter only. -  I affirm this is a Patient Initiated Episode with an Established Patient who has not had a related appointment within my department in the past 7 days or scheduled within the next 24 hours. Note: this encounter is not billable if this call serves to triage the patient into an appointment for the relevant concern. Total Time: minutes: 11-20 minutes. Chief complaint:  Pt says she is still not able to get out of the house without fear. Subjective:  Phone visit. States has not been able to get out of the house without fear except with the doctors or to get an MRI done.   She would want to be able to go to [employer] and shopping without being afraid. Finds herself as an old woman  who do not leave the house. States feels like she will not look good, she does not feel good and her clothes are not good when she leaves. Has been diagnosed with a pinched nerve in her back. Arm has been tingling with edema. Advised to consult her PCP. Supportive psychotherapy provided. She denies suicidal ideation/homicidal ideations. Denies symptoms of psychosis. Patient Active Problem List   Diagnosis    Abdominal pain    Asthma    GERD (gastroesophageal reflux disease)    S/P total knee replacement    ANA (obstructive sleep apnea)    Hiatal hernia    Lumbar back pain    Obese    Umbilical hernia    Major depressive disorder, recurrent, mild (HCC)    Major depressive disorder, recurrent, moderate (HCC)    Major depressive disorder, recurrent, unspecified ([facility]       Denies palpitation,SOB, Chest pain, headaches. In no acute distress. MEDICATION REVIEW:    Current Medications:    Current Outpatient Medications   Medication Sig    methylfolate (DEPLIN) 7.5 MG TABS tablet Take 1 tablet by mouth in the morning. buPROPion (WELLBUTRIN XL) 300 MG extended release tablet Take 1 tablet by mouth every morning    busPIRone (BUSPAR) 15 MG tablet Take 15 mg by mouth in the morning and 15 mg at noon and 15 mg before bedtime. TAKE 1 TABLET BY MOUTH THREE TIMES A DAY. sertraline (ZOLOFT) 100 MG tablet Take 1 tablet by mouth in the morning. WIXELA INHUB 500-50 MCG/ACT AEPB diskus inhaler INHALE 1 PUFF BY MOUTH TWICE A DAY    montelukast (SINGULAIR) 10 MG tablet TAKE 1 TABLET BY MOUTH NIGHTLY. SPIRIVA RESPIMAT 2.5 MCG/ACT AERS inhaler TAKE 2 PUFFS BY INHALATION DAILY.     azelastine (ASTELIN) 0.1 % nasal spray 1 spray by Nasal route 2 times daily Use in each nostril as directed    famotidine (PEPCID) 40 MG tablet Take 40 mg by mouth every evening    omeprazole (PRILOSEC OTC) 20 MG tablet Take 1 tablet by mouth in the morning and at bedtime [] TACTILE     [] GUSTATORY  [] SOMATIC         OBSESSIONS  [x] DENIES  [] PRESENT         SUICIDAL IDEATION  [x] DENIES  [] PRESENT W/O PLAN  [] PRESENT W/ PLAN       HOMICIDAL IDEATION  [x] DENIES  [] PRESENT W/O PLAN  [] PRESENT W/ PLAN           JUDGEMENT:   [x] GOOD   [] FAIR   [] POOR   INSIGHT:   [x] GOOD   [] FAIR   [] POOR     COGNITION:           SENSORIUM:   [x] ALERT   [] CLOUDED   [] DROWSY     ORIENTATION:   [x] INTACT   [] TIME:  PLACE  PERSON   RECENT & REMOTE MEMORY:   [] NORMAL   [x] OTHER:                  ATTENTION:   [] INTACT   [] MILD IMPAIRMENT   [] SEVERE IMPAIRMENT     CONCENTRATION:   [] INTACT   [] MILD IMPAIRMENT   [] SEVERE IMPAIRMENT     LANGUAGE:   [x] AVERAGE   [] ABOVE AVERAGE   [] BELOW AVERAGE     FUND OF KNOWLEDGE:   [] UNABLE TO ASSESS AT THIS TIME   [x] AVERAGE   [] ABOVE AVERAGE   [] BELOW AVERAGE      [] GOOD TO EXCELLENT KNOWLEDGE OF CURRENT EVENTS   [] POOR TO NO KNLEDGE OF CURRENT EVENTS           ABNORMAL MOVEMENTS:   [] NONE   [] TICS   [] TREMORS   [] BIZZARE      [] FACE   [] TRUNK   [] EXTREMETIES   [] GESTURES        SLEEP:   [x] GOOD   [] FAIR   [] POOR      MUSCLE STRENGTH AND TONE   [] WNL   [] ATROPHY   [] SPASTIC        [] FLACCID   [] COGWHEEL         Diagnoses/Impressions:    ICD-10-CM    1. Recurrent major depressive disorder, in partial remission ([facility]  F33.41       2. Generalized anxiety disorder  F41.1       3. Anxiety with obsessional features  F41.8       4.  Insomnia due to mental disorder  F51.05           TREATMENT GOALS:  Symptom reduction, Medication adherence, maintain therapeutic gains    LABS/IMAGING:    []  Ordered [x]  Reviewed []  New Labs Ordered:     LAB  Sodium   Date/Time Value Ref Range Status   08/14/2019 11:07  134 - 144 mmol/L Final     Potassium   Date/Time Value Ref Range Status   08/14/2019 11:07 AM 4.6 3.5 - 5.2 mmol/L Final     Chloride   Date/Time Value Ref Range Status   08/14/2019 11:07  96 - 106 mmol/L Final     CO2 Date/Time Value Ref Range Status   08/14/2019 11:07 AM 28 20 - 29 mmol/L Final     BUN   Date/Time Value Ref Range Status   08/14/2019 11:07 AM 11 8 - 27 mg/dL Final     ALT   Date/Time Value Ref Range Status   08/14/2019 11:07 AM 11 0 - 32 IU/L Final     AST   Date/Time Value Ref Range Status   08/14/2019 11:07 AM 17 0 - 40 IU/L Final       Please refer to the lab tab in the epic and care everywhere for the most recent lab results. Plan:     [x]  Medication ordered: Wellbutrin, Zoloft, BuSpar, L-methylfolate to target depression, anxiety, insomnia    [x]  Medication education/counseling provided  Medication dosage and time to take, purpose/expected benefits/risks, common side effects, lab monitoring required and reason, expected length of treatment, risk of no treatment, effects on pregnancy/nursing, financial availability. Educated patient on  side effects/risks/benefits of meds including cardiac arrhythmias, suicidal ideations, orthostatic hypotension, serotonin syndrome, risk of austin/hypomania from antidepressants, withdrawals from abrupt discontinuation of meds,  risk of bleeding, risk of seizures,  high blood pressure, dizziness, drowsiness, sedation , risk of falls, Risk & benefits discussed: including but not limited to possible off-label medication usage. [x] Follow MSE for sxs improvement     I have reviewed the patients controlled substance prescription history, as maintained in the [place] prescription monitoring program, so that the prescription(s) for a  controlled substance can be given. Recommendations and Referrals: Follow up with : MD, requires monitoring of response to medication, requires monitoring of medication side effects.     Time until next PMA:     Follow up with Mental Health Clinicians: psychotherapy interventions, improve level of functioning, monitoring to prevent decompensation /hospitalization, monitoring to maintain therapeutic gains, symptoms (resolving and

## 2022-08-09 ENCOUNTER — TELEPHONE (OUTPATIENT)
Dept: ORTHOPEDIC SURGERY | Age: 77
End: 2022-08-09

## 2022-08-09 NOTE — TELEPHONE ENCOUNTER
Pt called and she says she can only do telehealth because of her asthma. She wants a call back from MA.

## 2022-08-10 ENCOUNTER — TELEPHONE (OUTPATIENT)
Dept: ORTHOPEDIC SURGERY | Age: 77
End: 2022-08-10

## 2022-08-10 NOTE — TELEPHONE ENCOUNTER
She cancelled her appt this afternoon. She wants to know if ELENA would do a televisit or if anyone would do a televisit and if so she would like to be scheduled for that. She states she just has ? About her MRI but it is difficult for her to get out.

## 2022-08-10 NOTE — TELEPHONE ENCOUNTER
Left VM that she will need to come into the office and be seen by Dr. Elsi Hercules to review MRI and/ or discuss surgery.

## 2022-09-13 ENCOUNTER — NURSE ONLY (OUTPATIENT)
Dept: FAMILY MEDICINE CLINIC | Facility: CLINIC | Age: 77
End: 2022-09-13
Payer: MEDICARE

## 2022-09-13 DIAGNOSIS — Z00.00 ENCOUNTER FOR MEDICARE ANNUAL WELLNESS EXAM: ICD-10-CM

## 2022-09-13 DIAGNOSIS — E55.9 VITAMIN D DEFICIENCY: ICD-10-CM

## 2022-09-13 DIAGNOSIS — E78.00 PURE HYPERCHOLESTEROLEMIA: Primary | ICD-10-CM

## 2022-09-13 LAB
BACTERIA URINE, POC: NORMAL
BILIRUBIN, URINE, POC: NEGATIVE
BLOOD URINE, POC: NEGATIVE
CASTS URINE, POC: NORMAL
EPI CELLS URINE, POC: NORMAL
GLUCOSE URINE, POC: NEGATIVE
GRANS ABSOLUTE, POC: 4.7 K/UL
GRANULOCYTES %, POC: 60.1 %
HEMATOCRIT, POC: 41.4 %
HEMOGLOBIN, POC: 13.7 G/DL
KETONES, URINE, POC: NORMAL
LEUKOCYTE ESTERASE, URINE, POC: NEGATIVE
LYMPHOCYTE %, POC: 33 %
LYMPHS ABSOLUTE, POC: 2.6 K/UL
MCH, POC: 29.7 PG (ref 40–?)
MCHC, POC: 33.1
MCV, POC: 89.9
MONOCYTE %, POC: 6.9 %
MONOCYTE, ABSOLUTE POC: 0.5 K/UL
MPV, POC: 7.8 FL
NITRITE, URINE, POC: NEGATIVE
PH, URINE, POC: 6.5 (ref 4.6–8)
PLATELET COUNT, POC: 362 K/UL
PROTEIN,URINE, POC: NEGATIVE
RBC, POC: 4.61 M/UL
RBC, URINE, POC: NORMAL
RDW, POC: 13 %
SPECIFIC GRAVITY, URINE, POC: 1.02 (ref 1–1.03)
TRICHOMONAS URINE, POC: NORMAL
URINALYSIS CLARITY, POC: CLEAR
URINALYSIS COLOR, POC: YELLOW
UROBILINOGEN, POC: NORMAL
WBC, POC: 7.8 K/UL
WBC, URINE, POC: NORMAL
YEAST, URINE, POC: NORMAL

## 2022-09-13 PROCEDURE — 81000 URINALYSIS NONAUTO W/SCOPE: CPT | Performed by: FAMILY MEDICINE

## 2022-09-13 PROCEDURE — 85025 COMPLETE CBC W/AUTO DIFF WBC: CPT | Performed by: FAMILY MEDICINE

## 2022-09-14 LAB
25(OH)D3 SERPL-MCNC: 38.9 NG/ML (ref 30–100)
ALBUMIN SERPL-MCNC: 4.3 G/DL (ref 3.2–4.6)
ALBUMIN/GLOB SERPL: 1.5 {RATIO} (ref 1.2–3.5)
ALP SERPL-CCNC: 74 U/L (ref 50–136)
ALT SERPL-CCNC: 17 U/L (ref 12–65)
ANION GAP SERPL CALC-SCNC: 8 MMOL/L (ref 4–13)
AST SERPL-CCNC: 16 U/L (ref 15–37)
BILIRUB SERPL-MCNC: 0.4 MG/DL (ref 0.2–1.1)
BUN SERPL-MCNC: 10 MG/DL (ref 8–23)
CALCIUM SERPL-MCNC: 9.5 MG/DL (ref 8.3–10.4)
CHLORIDE SERPL-SCNC: 104 MMOL/L (ref 101–110)
CHOLEST SERPL-MCNC: 210 MG/DL
CO2 SERPL-SCNC: 26 MMOL/L (ref 21–32)
CREAT SERPL-MCNC: 0.9 MG/DL (ref 0.6–1)
GLOBULIN SER CALC-MCNC: 2.8 G/DL (ref 2.3–3.5)
GLUCOSE SERPL-MCNC: 77 MG/DL (ref 65–100)
HDLC SERPL-MCNC: 56 MG/DL (ref 40–60)
HDLC SERPL: 3.8 {RATIO}
LDLC SERPL CALC-MCNC: 126.2 MG/DL
POTASSIUM SERPL-SCNC: 3.6 MMOL/L (ref 3.5–5.1)
PROT SERPL-MCNC: 7.1 G/DL (ref 6.3–8.2)
SODIUM SERPL-SCNC: 138 MMOL/L (ref 136–145)
TRIGL SERPL-MCNC: 139 MG/DL (ref 35–150)
TSH, 3RD GENERATION: 4.87 UIU/ML (ref 0.36–3.74)
VLDLC SERPL CALC-MCNC: 27.8 MG/DL (ref 6–23)

## 2022-09-15 ENCOUNTER — NURSE TRIAGE (OUTPATIENT)
Dept: OTHER | Facility: CLINIC | Age: 77
End: 2022-09-15

## 2022-09-15 NOTE — TELEPHONE ENCOUNTER
Received call from [facility] with Red Flag Complaint. Subjective: Caller states \"Been vomiting and worsening the last 24 hours cant keep anything down. It comes and goes and will vomit once a day. Been going on for 2 weeks. \"     Current Symptoms: Vomiting, abdominal pain    Onset: 2 weeks ago; intermittent    Associated Symptoms: NA    Pain Severity: 10/10; dull; constant    Temperature:   Denies Fever    What has been tried: Nothing     LMP: NA Pregnant: NA    Recommended disposition: Go to ED/UCC Now (Or to Office with PCP Approval) Was unable to get through to the office, advised patient to go to [address] [place] [address]. Care advice provided, patient verbalizes understanding; denies any other questions or concerns; instructed to call back for any new or worsening symptoms. The patient didn't want to talk to anyone else and I explained to her the importance of the call and may need to be evaluate in ER if unable to get in and patient states, \"no I dont feel good and I not going, then hung up. \"    Attention Provider: Thank you for allowing me to participate in the care of your patient. The patient was connected to triage in response to information provided to the ECC/PSC. Please do not respond through this encounter as the response is not directed to a shared pool.           Reason for Disposition   Constant abdominal pain lasting > 2 hours    Protocols used: Vomiting-ADULT-[place]

## 2022-10-10 RX ORDER — AZELASTINE HYDROCHLORIDE 137 UG/1
SPRAY, METERED NASAL
Qty: 1 EACH | Refills: 11 | Status: SHIPPED | OUTPATIENT
Start: 2022-10-10 | End: 2022-10-11 | Stop reason: SDUPTHER

## 2022-10-11 ENCOUNTER — OFFICE VISIT (OUTPATIENT)
Dept: FAMILY MEDICINE CLINIC | Facility: CLINIC | Age: 77
End: 2022-10-11
Payer: COMMERCIAL

## 2022-10-11 VITALS
DIASTOLIC BLOOD PRESSURE: 100 MMHG | SYSTOLIC BLOOD PRESSURE: 160 MMHG | BODY MASS INDEX: 35.14 KG/M2 | HEIGHT: 60 IN | WEIGHT: 179 LBS

## 2022-10-11 DIAGNOSIS — Z00.00 ENCOUNTER FOR MEDICARE ANNUAL WELLNESS EXAM: ICD-10-CM

## 2022-10-11 DIAGNOSIS — K21.9 GERD WITHOUT ESOPHAGITIS: ICD-10-CM

## 2022-10-11 DIAGNOSIS — Z78.0 POSTMENOPAUSAL: ICD-10-CM

## 2022-10-11 DIAGNOSIS — Z00.00 MEDICARE ANNUAL WELLNESS VISIT, SUBSEQUENT: ICD-10-CM

## 2022-10-11 DIAGNOSIS — R79.89 ELEVATED TSH: ICD-10-CM

## 2022-10-11 DIAGNOSIS — Z13.31 SCREENING FOR DEPRESSION: ICD-10-CM

## 2022-10-11 DIAGNOSIS — Z00.00 ROUTINE GENERAL MEDICAL EXAMINATION AT A HEALTH CARE FACILITY: Primary | ICD-10-CM

## 2022-10-11 DIAGNOSIS — Z91.09 ENVIRONMENTAL ALLERGIES: ICD-10-CM

## 2022-10-11 DIAGNOSIS — I10 ESSENTIAL (PRIMARY) HYPERTENSION: ICD-10-CM

## 2022-10-11 DIAGNOSIS — E78.00 PURE HYPERCHOLESTEROLEMIA: ICD-10-CM

## 2022-10-11 LAB
T3 SERPL-MCNC: 0.97 NG/ML (ref 0.6–1.81)
T4 SERPL-MCNC: 8.6 UG/DL (ref 4.8–13.9)
TSH, 3RD GENERATION: 3.16 UIU/ML (ref 0.36–3.74)

## 2022-10-11 PROCEDURE — 1123F ACP DISCUSS/DSCN MKR DOCD: CPT | Performed by: FAMILY MEDICINE

## 2022-10-11 PROCEDURE — G0439 PPPS, SUBSEQ VISIT: HCPCS | Performed by: FAMILY MEDICINE

## 2022-10-11 PROCEDURE — 90694 VACC AIIV4 NO PRSRV 0.5ML IM: CPT | Performed by: FAMILY MEDICINE

## 2022-10-11 PROCEDURE — G0008 ADMIN INFLUENZA VIRUS VAC: HCPCS | Performed by: FAMILY MEDICINE

## 2022-10-11 PROCEDURE — G8484 FLU IMMUNIZE NO ADMIN: HCPCS | Performed by: FAMILY MEDICINE

## 2022-10-11 RX ORDER — AZELASTINE HYDROCHLORIDE 137 UG/1
SPRAY, METERED NASAL
Qty: 1 EACH | Refills: 11 | Status: SHIPPED | OUTPATIENT
Start: 2022-10-11

## 2022-10-11 RX ORDER — OMEPRAZOLE 40 MG/1
CAPSULE, DELAYED RELEASE ORAL
Qty: 90 CAPSULE | Refills: 3 | Status: SHIPPED | OUTPATIENT
Start: 2022-10-11

## 2022-10-11 RX ORDER — MONTELUKAST SODIUM 10 MG/1
TABLET ORAL
Qty: 90 TABLET | Refills: 3 | Status: SHIPPED | OUTPATIENT
Start: 2022-10-11

## 2022-10-11 RX ORDER — OMEPRAZOLE 40 MG/1
CAPSULE, DELAYED RELEASE ORAL
COMMUNITY
Start: 2022-08-16 | End: 2022-10-11 | Stop reason: SDUPTHER

## 2022-10-11 ASSESSMENT — PATIENT HEALTH QUESTIONNAIRE - PHQ9
SUM OF ALL RESPONSES TO PHQ QUESTIONS 1-9: 2
6. FEELING BAD ABOUT YOURSELF - OR THAT YOU ARE A FAILURE OR HAVE LET YOURSELF OR YOUR FAMILY DOWN: 0
3. TROUBLE FALLING OR STAYING ASLEEP: 0
5. POOR APPETITE OR OVEREATING: 0
SUM OF ALL RESPONSES TO PHQ QUESTIONS 1-9: 2
4. FEELING TIRED OR HAVING LITTLE ENERGY: 0
9. THOUGHTS THAT YOU WOULD BE BETTER OFF DEAD, OR OF HURTING YOURSELF: 0
1. LITTLE INTEREST OR PLEASURE IN DOING THINGS: 0
2. FEELING DOWN, DEPRESSED OR HOPELESS: 2
SUM OF ALL RESPONSES TO PHQ QUESTIONS 1-9: 2
7. TROUBLE CONCENTRATING ON THINGS, SUCH AS READING THE NEWSPAPER OR WATCHING TELEVISION: 0
8. MOVING OR SPEAKING SO SLOWLY THAT OTHER PEOPLE COULD HAVE NOTICED. OR THE OPPOSITE, BEING SO FIGETY OR RESTLESS THAT YOU HAVE BEEN MOVING AROUND A LOT MORE THAN USUAL: 0
SUM OF ALL RESPONSES TO PHQ9 QUESTIONS 1 & 2: 2
SUM OF ALL RESPONSES TO PHQ QUESTIONS 1-9: 2
10. IF YOU CHECKED OFF ANY PROBLEMS, HOW DIFFICULT HAVE THESE PROBLEMS MADE IT FOR YOU TO DO YOUR WORK, TAKE CARE OF THINGS AT HOME, OR GET ALONG WITH OTHER PEOPLE: 0

## 2022-10-11 NOTE — PATIENT INSTRUCTIONS
Personalized Preventive Plan for Stuart Mi [address] - 10/11/2022  Medicare offers a range of preventive health benefits. Some of the tests and screenings are paid in full while other may be subject to a deductible, co-insurance, and/or copay. Some of these benefits include a comprehensive review of your medical history including lifestyle, illnesses that may run in your family, and various assessments and screenings as appropriate. After reviewing your medical record and screening and assessments performed today your provider may have ordered immunizations, labs, imaging, and/or referrals for you. A list of these orders (if applicable) as well as your Preventive Care list are included within your After Visit Summary for your review. Other Preventive Recommendations:    A preventive eye exam performed by an eye specialist is recommended every 1-2 years to screen for glaucoma; cataracts, macular degeneration, and other eye disorders. A preventive dental visit is recommended every 6 months. Try to get at least 150 minutes of exercise per week or 10,000 steps per day on a pedometer . Order or download the FREE \"Exercise & Physical Activity: Your Everyday Guide\" from [employer]. Call 3-204.563.3328 or search [employer] online. You need 5088-0775 mg of calcium and 8697-5539 IU of vitamin D per day. It is possible to meet your calcium requirement with diet alone, but a vitamin D supplement is usually necessary to meet this goal.  When exposed to the sun, use a sunscreen that protects against both UVA and UVB radiation with an SPF of 30 or greater. Reapply every 2 to 3 hours or after sweating, drying off with a towel, or swimming. Always wear a seat belt when traveling in a car. Always wear a helmet when riding a bicycle or motorcycle.

## 2022-10-24 ENCOUNTER — TELEMEDICINE (OUTPATIENT)
Dept: FAMILY MEDICINE CLINIC | Facility: CLINIC | Age: 77
End: 2022-10-24
Payer: COMMERCIAL

## 2022-10-24 DIAGNOSIS — R79.89 ELEVATED TSH: Primary | ICD-10-CM

## 2022-10-24 PROCEDURE — 99442 PR PHYS/QHP TELEPHONE EVALUATION 11-20 MIN: CPT | Performed by: FAMILY MEDICINE

## 2022-10-24 ASSESSMENT — PATIENT HEALTH QUESTIONNAIRE - PHQ9
4. FEELING TIRED OR HAVING LITTLE ENERGY: 1
SUM OF ALL RESPONSES TO PHQ QUESTIONS 1-9: 3
8. MOVING OR SPEAKING SO SLOWLY THAT OTHER PEOPLE COULD HAVE NOTICED. OR THE OPPOSITE, BEING SO FIGETY OR RESTLESS THAT YOU HAVE BEEN MOVING AROUND A LOT MORE THAN USUAL: 0
9. THOUGHTS THAT YOU WOULD BE BETTER OFF DEAD, OR OF HURTING YOURSELF: 0
SUM OF ALL RESPONSES TO PHQ QUESTIONS 1-9: 3
7. TROUBLE CONCENTRATING ON THINGS, SUCH AS READING THE NEWSPAPER OR WATCHING TELEVISION: 0
6. FEELING BAD ABOUT YOURSELF - OR THAT YOU ARE A FAILURE OR HAVE LET YOURSELF OR YOUR FAMILY DOWN: 0
2. FEELING DOWN, DEPRESSED OR HOPELESS: 0
SUM OF ALL RESPONSES TO PHQ9 QUESTIONS 1 & 2: 2
5. POOR APPETITE OR OVEREATING: 0
10. IF YOU CHECKED OFF ANY PROBLEMS, HOW DIFFICULT HAVE THESE PROBLEMS MADE IT FOR YOU TO DO YOUR WORK, TAKE CARE OF THINGS AT HOME, OR GET ALONG WITH OTHER PEOPLE: 0
SUM OF ALL RESPONSES TO PHQ QUESTIONS 1-9: 3
SUM OF ALL RESPONSES TO PHQ QUESTIONS 1-9: 3
1. LITTLE INTEREST OR PLEASURE IN DOING THINGS: 2
3. TROUBLE FALLING OR STAYING ASLEEP: 0

## 2022-10-24 ASSESSMENT — ENCOUNTER SYMPTOMS
SHORTNESS OF BREATH: 0
VOMITING: 0
NAUSEA: 0

## 2022-10-24 NOTE — PROGRESS NOTES
PROGRESS NOTE  This visit was conducted via the phone with patient's consent to the visit and all associated charges to reduce the patient's risk of exposure to COVID-19 and continuity of care for an established patient. She and/or her healthcare decision maker is aware that this patient-initiated phone encounter is a billable service, with coverage as determined by her insurance carrier. She is aware that she may receive a bill and has provided verbal consent to proceed: Yes    Vitals and physical exam deferred due to telephone visit. Total Time: minutes: 11-20 minutes. SUBJECTIVE:   Tatyana Jeffries is a 69 y.o. female seen for a follow up visit regarding the following chief complaint:     Chief Complaint   Patient presents with   [facility] patient is doing a phone call visit to go over her lab results without any new complaints      Past Medical History, Past Surgical History, Family history, Social History, and Medications were all reviewed with the patient today and updated as necessary. Current Outpatient Medications   Medication Sig Dispense Refill    omeprazole (PRILOSEC) 40 MG delayed release capsule TAKE 1 CAPSULE BY MOUTH EVERY DAY 90 capsule 3    Azelastine HCl 137 MCG/SPRAY SOLN 1 SPRAY BY NASAL ROUTE 2 TIMES DAILY USE IN EACH NOSTRIL AS DIRECTED 1 each 11    montelukast (SINGULAIR) 10 MG tablet TAKE 1 TABLET BY MOUTH NIGHTLY. 90 tablet 3    methylfolate (DEPLIN) 7.5 MG TABS tablet Take 1 tablet by mouth in the morning. 30 tablet 2    buPROPion (WELLBUTRIN XL) 300 MG extended release tablet Take 1 tablet by mouth every morning 30 tablet 2    busPIRone (BUSPAR) 15 MG tablet Take 15 mg by mouth in the morning and 15 mg at noon and 15 mg before bedtime. TAKE 1 TABLET BY MOUTH THREE TIMES A DAY. 30 tablet 2    sertraline (ZOLOFT) 100 MG tablet Take 1 tablet by mouth in the morning.  30 tablet 2    WIXELA INHUB 500-50 MCG/ACT AEPB diskus inhaler INHALE 1 PUFF BY MOUTH TWICE A DAY 60 each 3    SPIRIVA RESPIMAT 2.5 MCG/ACT AERS inhaler TAKE 2 PUFFS BY INHALATION DAILY. 3 each 3    famotidine (PEPCID) 40 MG tablet Take 40 mg by mouth every evening      albuterol sulfate  (90 Base) MCG/ACT inhaler Inhale 1 puff into the lungs every 4 hours as needed       No current facility-administered medications for this visit. Allergies   Allergen Reactions    Eggs Or Egg-Derived Products Shortness Of Breath     Allergy to egg whites, asthma reaction    Milk Protein Shortness Of Breath     Allergy to cow's milk    Peanut (Diagnostic) Other (See Comments) and Shortness Of Breath    Pollen Extract Shortness Of Breath    Lac Bovis Other (See Comments)     \"cows milk\"     Patient Active Problem List   Diagnosis    Abdominal pain    Asthma    GERD (gastroesophageal reflux disease)    S/P total knee replacement    ANA (obstructive sleep apnea)    Hiatal hernia    Lumbar back pain    Obese    Umbilical hernia    Major depressive disorder, recurrent, mild (HCC)    Major depressive disorder, recurrent, moderate (HCC)    Major depressive disorder, recurrent, unspecified ([facility] [address]     Past Medical History:   Diagnosis Date    Asthma     managed with medication     Breast CA ([facility] [address] 1994    right side, mastectomy only    Difficult intubation     pt reports was told this after 1995 breast surgery- has not had issue/problems with 2014 knee scope or 2015 lap aron    GERD (gastroesophageal reflux disease)     OTC prn controls    Hiatal hernia     High cholesterol     pt refuses to take her medication     History of skin cancer     removed basal cell right forearm    Ill-defined condition     hiatal hernia    Obesity (BMI 30-39. 9)     BMI 34    Unspecified sleep apnea     non compliant with CPAP     Past Surgical History:   Procedure Laterality Date    BREAST SURGERY Right     right breast and 26 lymph nodes removed     CHOLECYSTECTOMY  1/2015    Dr Elayne Harden COLONOSCOPY      HYSTERECTOMY (CERVIX STATUS UNKNOWN)  2019    IMPLANT BREAST SILICONE/EQ Bilateral 4278    KNEE ARTHROSCOPY  5/2014    LUMBAR FUSION  2011    L4-L5    MASTECTOMY, MODIFIED RADICAL Right 1995    right side, for cancer    UPPER GASTROINTESTINAL ENDOSCOPY       Family History   Problem Relation Age of Onset    Breast Cancer Cousin     Stroke Mother     Cancer Father         prostate    Heart Disease Father     Breast Cancer Cousin      Social History     Tobacco Use    Smoking status: Never     Passive exposure: Never    Smokeless tobacco: Never   Substance Use Topics    Alcohol use: No         Review of Systems   Constitutional:  Negative for fatigue and fever. Respiratory:  Negative for shortness of breath. Cardiovascular:  Negative for chest pain. Gastrointestinal:  Negative for nausea and vomiting. OBJECTIVE:  There were no vitals taken for this visit. Physical Exam     Medical problems and test results were reviewed with the patient today. Recent Results (from the past 672 hour(s))   T4    Collection Time: 10/11/22 11:04 AM   Result Value Ref Range    T4, Total 8.6 4.8 - 13.9 ug/dL   T3    Collection Time: 10/11/22 11:04 AM   Result Value Ref Range    T3, Total 0.97 0.60 - 1.81 ng/mL   TSH    Collection Time: 10/11/22 11:04 AM   Result Value Ref Range    TSH, 3RD GENERATION 3.160 0.358 - 3.740 uIU/mL       ASSESSMENT and PLAN    Visit Diagnoses and Associated Orders       Elevated TSH    -  Primary                     Diagnosis Orders   1. Elevated TSH        , Lenka Howard was seen today for discuss labs.     Diagnoses and all orders for this visit:    Elevated TSH    , Reviewed her labs answered all her questions counseling supportive care gone over follow-up of her next appointment in the near future

## 2022-10-31 ENCOUNTER — TELEMEDICINE (OUTPATIENT)
Dept: BEHAVIORAL/MENTAL HEALTH CLINIC | Age: 77
End: 2022-10-31
Payer: COMMERCIAL

## 2022-10-31 DIAGNOSIS — F33.41 RECURRENT MAJOR DEPRESSIVE DISORDER, IN PARTIAL REMISSION (HCC): ICD-10-CM

## 2022-10-31 DIAGNOSIS — F51.05 INSOMNIA DUE TO MENTAL DISORDER: ICD-10-CM

## 2022-10-31 DIAGNOSIS — F41.8 ANXIETY WITH OBSESSIONAL FEATURES: ICD-10-CM

## 2022-10-31 DIAGNOSIS — F40.02 AGORAPHOBIA WITHOUT PANIC: ICD-10-CM

## 2022-10-31 DIAGNOSIS — F41.1 GENERALIZED ANXIETY DISORDER: Primary | ICD-10-CM

## 2022-10-31 PROCEDURE — 99442 PR PHYS/QHP TELEPHONE EVALUATION 11-20 MIN: CPT | Performed by: PSYCHIATRY & NEUROLOGY

## 2022-10-31 RX ORDER — BUSPIRONE HYDROCHLORIDE 15 MG/1
15 TABLET ORAL 3 TIMES DAILY
Qty: 30 TABLET | Refills: 3 | Status: SHIPPED | OUTPATIENT
Start: 2022-10-31

## 2022-10-31 RX ORDER — SERTRALINE HYDROCHLORIDE 100 MG/1
100 TABLET, FILM COATED ORAL DAILY
Qty: 30 TABLET | Refills: 3 | Status: SHIPPED | OUTPATIENT
Start: 2022-10-31

## 2022-10-31 RX ORDER — ALPRAZOLAM 0.25 MG/1
0.25 TABLET ORAL 2 TIMES DAILY PRN
Qty: 60 TABLET | Refills: 3 | Status: SHIPPED | OUTPATIENT
Start: 2022-10-31 | End: 2023-02-28

## 2022-10-31 RX ORDER — BUPROPION HYDROCHLORIDE 300 MG/1
300 TABLET ORAL EVERY MORNING
Qty: 30 TABLET | Refills: 3 | Status: SHIPPED | OUTPATIENT
Start: 2022-10-31

## 2022-10-31 ASSESSMENT — PATIENT HEALTH QUESTIONNAIRE - PHQ9
5. POOR APPETITE OR OVEREATING: 0
7. TROUBLE CONCENTRATING ON THINGS, SUCH AS READING THE NEWSPAPER OR WATCHING TELEVISION: 0
2. FEELING DOWN, DEPRESSED OR HOPELESS: 0
6. FEELING BAD ABOUT YOURSELF - OR THAT YOU ARE A FAILURE OR HAVE LET YOURSELF OR YOUR FAMILY DOWN: 0
9. THOUGHTS THAT YOU WOULD BE BETTER OFF DEAD, OR OF HURTING YOURSELF: 0
SUM OF ALL RESPONSES TO PHQ QUESTIONS 1-9: 1
SUM OF ALL RESPONSES TO PHQ9 QUESTIONS 1 & 2: 0
1. LITTLE INTEREST OR PLEASURE IN DOING THINGS: 0
SUM OF ALL RESPONSES TO PHQ QUESTIONS 1-9: 1
8. MOVING OR SPEAKING SO SLOWLY THAT OTHER PEOPLE COULD HAVE NOTICED. OR THE OPPOSITE, BEING SO FIGETY OR RESTLESS THAT YOU HAVE BEEN MOVING AROUND A LOT MORE THAN USUAL: 0
3. TROUBLE FALLING OR STAYING ASLEEP: 0
10. IF YOU CHECKED OFF ANY PROBLEMS, HOW DIFFICULT HAVE THESE PROBLEMS MADE IT FOR YOU TO DO YOUR WORK, TAKE CARE OF THINGS AT HOME, OR GET ALONG WITH OTHER PEOPLE: 0
4. FEELING TIRED OR HAVING LITTLE ENERGY: 1

## 2022-10-31 ASSESSMENT — ANXIETY QUESTIONNAIRES
2. NOT BEING ABLE TO STOP OR CONTROL WORRYING: 0
5. BEING SO RESTLESS THAT IT IS HARD TO SIT STILL: 1
7. FEELING AFRAID AS IF SOMETHING AWFUL MIGHT HAPPEN: 1
1. FEELING NERVOUS, ANXIOUS, OR ON EDGE: 1
6. BECOMING EASILY ANNOYED OR IRRITABLE: 0
4. TROUBLE RELAXING: 1
IF YOU CHECKED OFF ANY PROBLEMS ON THIS QUESTIONNAIRE, HOW DIFFICULT HAVE THESE PROBLEMS MADE IT FOR YOU TO DO YOUR WORK, TAKE CARE OF THINGS AT HOME, OR GET ALONG WITH OTHER PEOPLE: EXTREMELY DIFFICULT
GAD7 TOTAL SCORE: 5
3. WORRYING TOO MUCH ABOUT DIFFERENT THINGS: 1

## 2022-10-31 NOTE — PROGRESS NOTES
Patient:  Chris Ibarra  Age:  69 y.o.  :  1945     SEX:  female MRN:  [id number]     RACE: White (non-)     SEEN:  [x]  PATIENT  []  SPOUSE []  OTHER:              PHQ-9  10/31/2022 10/24/2022 10/11/2022   Little interest or pleasure in doing things 0 2 0   Little interest or pleasure in doing things - - -   Feeling down, depressed, or hopeless 0 0 2   Trouble falling or staying asleep, or sleeping too much 0 0 0   Trouble falling or staying asleep, or sleeping too much - - -   Feeling tired or having little energy 1 1 0   Feeling tired or having little energy - - -   Poor appetite or overeating 0 0 0   Poor appetite, weight loss, or overeating - - -   Feeling bad about yourself - or that you are a failure or have let yourself or your family down 0 0 0   Feeling bad about yourself - or that you are a failure or have let yourself or your family down - - -   Trouble concentrating on things, such as reading the newspaper or watching television 0 0 0   Trouble concentrating on things such as school, work, reading, or watching TV - - -   Moving or speaking so slowly that other people could have noticed.  Or the opposite - being so fidgety or restless that you have been moving around a lot more than usual 0 0 0   Moving or speaking so slowly that other people could have noticed; or the opposite being so fidgety that others notice - - -   Thoughts that you would be better off dead, or of hurting yourself in some way 0 0 0   Thoughts of being better off dead, or hurting yourself in some way - - -   PHQ-2 Score 0 2 2   Total Score PHQ 2 - - -   PHQ-9 Total Score 1 3 2   PHQ 9 Score - - -   If you checked off any problems, how difficult have these problems made it for you to do your work, take care of things at home, or get along with other people? 0 0 0   How difficult have these problems made it for you to do your work, take care of your home and get along with others - - -       JAVAD-7 SCREENING 10/31/2022 8/8/2022 2/9/2022   Feeling nervous, anxious, or on edge Several days Not at all -   Not being able to stop or control worrying Not at all More than half the days -   Worrying too much about different things Several days Nearly every day -   Trouble relaxing Several days Not at all -   Being so restless that it is hard to sit still Several days Not at all -   Becoming easily annoyed or irritable Not at all Not at all -   Feeling afraid as if something awful might happen Several days Several days -   JAVAD-7 Total Score 5 6 -   How difficult have these problems made it for you to do your work, take care of things at home, or get along with other people? Extremely difficult Not difficult at all Not difficult at all   Feeling nervous, anxious, or on edge - - Nearly every day   JAVAD-7 Total Score - - 7        I was at home while conducting this encounter. Consent:  She and/or her healthcare decision maker is aware that this patient-initiated Telehealth encounter is a billable service, with coverage as determined by her insurance carrier. She is aware that she may receive a bill and has provided verbal consent to proceed: YesPatient identification was verified, and a caregiver was present when appropriate. The patient was located in a state where the provider was credentialed to provide care. This virtual visit was conducted telephone encounter only. -  I affirm this is a Patient Initiated Episode with an Established Patient who has not had a related appointment within my department in the past 7 days or scheduled within the next 24 hours. Note: this encounter is not billable if this call serves to triage the patient into an appointment for the relevant concern. Total Time: minutes: 11-20 minutes. Chief complaint:  Pt says she still feels going out. Subjective:  Phone visit. States she still feels going out.  took a day off today because kids come for candy.   She does not like to go back. States she has met with her new PCP and her pulmonologist Praveena Hilario. States they have been talking about flu and it is disconcerting. She got her flu shot. Still afraid to go out. We will start her on a small dose of Xanax as needed for agoraphobia. She states she gets along well with her . Supportive psychotherapy provided. She denies suicidal ideation/homicidal ideations. Denies symptoms of psychosis. Patient Active Problem List   Diagnosis    Abdominal pain    Asthma    GERD (gastroesophageal reflux disease)    S/P total knee replacement    ANA (obstructive sleep apnea)    Hiatal hernia    Lumbar back pain    Obese    Umbilical hernia    Major depressive disorder, recurrent, mild (HCC)    Major depressive disorder, recurrent, moderate (HCC)    Major depressive disorder, recurrent, unspecified ([address]       Denies palpitation,SOB, Chest pain, headaches. In no acute distress. MEDICATION REVIEW:    Current Medications:    Current Outpatient Medications   Medication Sig    buPROPion (WELLBUTRIN XL) 300 MG extended release tablet Take 1 tablet by mouth every morning    busPIRone (BUSPAR) 15 MG tablet Take 15 mg by mouth 3 times daily TAKE 1 TABLET BY MOUTH THREE TIMES A DAY    sertraline (ZOLOFT) 100 MG tablet Take 1 tablet by mouth daily    ALPRAZolam (XANAX) 0.25 MG tablet Take 1 tablet by mouth 2 times daily as needed for Anxiety for up to 120 days. omeprazole (PRILOSEC) 40 MG delayed release capsule TAKE 1 CAPSULE BY MOUTH EVERY DAY    Azelastine HCl 137 MCG/SPRAY SOLN 1 SPRAY BY NASAL ROUTE 2 TIMES DAILY USE IN EACH NOSTRIL AS DIRECTED    montelukast (SINGULAIR) 10 MG tablet TAKE 1 TABLET BY MOUTH NIGHTLY. methylfolate (DEPLIN) 7.5 MG TABS tablet Take 1 tablet by mouth in the morning. WIXELA INHUB 500-50 MCG/ACT AEPB diskus inhaler INHALE 1 PUFF BY MOUTH TWICE A DAY    SPIRIVA RESPIMAT 2.5 MCG/ACT AERS inhaler TAKE 2 PUFFS BY INHALATION DAILY.     famotidine (PEPCID) 40 MG tablet Take 40 mg by mouth every evening    albuterol sulfate  (90 Base) MCG/ACT inhaler Inhale 1 puff into the lungs every 4 hours as needed     No current facility-administered medications for this visit. Allergies   Allergen Reactions    Eggs Or Egg-Derived Products Shortness Of Breath     Allergy to egg whites, asthma reaction    Milk Protein Shortness Of Breath     Allergy to cow's milk    Peanut (Diagnostic) Other (See Comments) and Shortness Of Breath    Pollen Extract Shortness Of Breath    Lac Bovis Other (See Comments)     \"cows milk\"       Past Medical History, Past Surgical History, Family history, Social History, and Medications were all reviewed with the patient today and updated as necessary.      Compliant with medication: Yes   Side effects from medications:  No     EXAMINATION  Musculoskeletal    GAIT AND STATION   [] WNL   [] RESTRICTED   [] UNSTEADY WALK        [] ABNORMAL   [] UNBALANCED         PSYCHIATRIC     GENERAL APPEARANCE:   []  WELL GROOMED []     DISHEVELED   []  UNKEMPT      []  UNUSUAL/BIZZARE    [] WNL       ATTITUDE:   [x] COOPERATIVE   [] GUARDED   [] SUSPICIOUS      [] HOSTILE                            BEHAVIOR:   [x] CALM   [] HYPERACTIVE   [] MANNERISMS      [] BIZZARE         SPEECH:   [x] NORMAL FOR CLIENT   [] SPONTANEOUS   [] SLURRED   [] WHISPERING      [] LOUD   [] PRESSURED   [] ARTICULATE       EYE CONTACT:   [] WNL   [] BLANK STARE   [] INTENSE      [] AVOIDANT         MOOD:   [] EUTHYMIC   [x] ANXIOUS   [] DEPRESSED      [] IRRITABLE   [] ANGRY   [] APATHETIC     AFFECT:   [] CONGRUENT WITH MOOD   [] FLAT   [] CONSTRICTED      [] INAPPROPRIATE   [] LABILE           THOUGHT PROCESS:   [x] LOGICAL/GOAL-DIRECTED   [] FOI   [] CIRCUMSANTIAL      [] INCOHERENT   [] TANGENTIAL   [] CONCRETE      [] PERSEVERATION           THOUGHT CONTENT:                DELUSIONS  [x] DENIES  [] GRANDIOSE  [] PERSECUTORY  [] Caodaism  [] REFERENCE HALLUCINATIONS  [x] DENIES  [] AUDITORY  [] VISUAL  [] OLFACTORY  [] TACTILE     [] GUSTATORY  [] SOMATIC         OBSESSIONS  [x] DENIES  [] PRESENT         SUICIDAL IDEATION  [x] DENIES  [] PRESENT W/O PLAN  [] PRESENT W/ PLAN       HOMICIDAL IDEATION  [x] DENIES  [] PRESENT W/O PLAN  [] PRESENT W/ PLAN           JUDGEMENT:   [x] GOOD   [] FAIR   [] POOR   INSIGHT:   [x] GOOD   [] FAIR   [] POOR     COGNITION:           SENSORIUM:   [x] ALERT   [] CLOUDED   [] DROWSY     ORIENTATION:   [x] INTACT   [] TIME:  PLACE  PERSON   RECENT & REMOTE MEMORY:   [] NORMAL   [x] OTHER:                  ATTENTION:   [] INTACT   [x] MILD IMPAIRMENT   [] SEVERE IMPAIRMENT     CONCENTRATION:   [] INTACT   [x] MILD IMPAIRMENT   [] SEVERE IMPAIRMENT     LANGUAGE:   [x] AVERAGE   [] ABOVE AVERAGE   [] BELOW AVERAGE     FUND OF KNOWLEDGE:   [] UNABLE TO ASSESS AT THIS TIME   [x] AVERAGE   [] ABOVE AVERAGE   [] BELOW AVERAGE      [] GOOD TO EXCELLENT KNOWLEDGE OF CURRENT EVENTS   [] POOR TO NO KNLEDGE OF CURRENT EVENTS           ABNORMAL MOVEMENTS:   [] NONE   [] TICS   [] TREMORS   [] BIZZARE      [] FACE   [] TRUNK   [] EXTREMETIES   [] GESTURES        SLEEP:   [x] GOOD   [] FAIR   [] POOR      MUSCLE STRENGTH AND TONE   [] WNL   [] ATROPHY   [] SPASTIC        [] FLACCID   [] COGWHEEL         Diagnoses/Impressions:    ICD-10-CM    1. Generalized anxiety disorder  F41.1 ALPRAZolam (XANAX) 0.25 MG tablet      2. Recurrent major depressive disorder, [facility]  F33.41       3. Anxiety with obsessional features  F41.8       4. Insomnia due to mental disorder  F51.05       5.  Agoraphobia without panic  F40.02           TREATMENT GOALS:  Symptom reduction, Medication adherence, maintain therapeutic gains    LABS/IMAGING:    []  Ordered [x]  Reviewed []  New Labs Ordered:     LAB  Sodium   Date/Time Value Ref Range Status   09/13/2022 02:20  136 - 145 mmol/L Final     Potassium   Date/Time Value Ref Range Status 09/13/2022 02:20 PM 3.6 3.5 - 5.1 mmol/L Final     Chloride   Date/Time Value Ref Range Status   09/13/2022 02:20  101 - 110 mmol/L Final     CO2   Date/Time Value Ref Range Status   09/13/2022 02:20 PM 26 21 - 32 mmol/L Final     BUN   Date/Time Value Ref Range Status   09/13/2022 02:20 PM 10 8 - 23 MG/DL Final     ALT   Date/Time Value Ref Range Status   09/13/2022 02:20 PM 17 12 - 65 U/L Final     AST   Date/Time Value Ref Range Status   09/13/2022 02:20 PM 16 15 - 37 U/L Final       Please refer to the lab tab in the epic and care everywhere for the most recent lab results. Plan:     [x]  Medication ordered: Wellbutrin, Zoloft, BuSpar, Xanax to target depression, anxiety, insomnia, agoraphobia    [x]  Medication education/counseling provided  Medication dosage and time to take, purpose/expected benefits/risks, common side effects, lab monitoring required and reason, expected length of treatment, risk of no treatment, effects on pregnancy/nursing, financial availability. Educated patient on  side effects/risks/benefits of meds including cardiac arrhythmias, suicidal ideations, orthostatic hypotension, serotonin syndrome, risk of austin/hypomania from antidepressants, withdrawals from abrupt discontinuation of meds,  risk of bleeding, risk of seizures, addiction potential, memory impairment with long term use of benzos, respiratory depression, high blood pressure, dizziness, drowsiness, sedation , risk of falls, Risk & benefits discussed: including but not limited to possible off-label medication usage. [x] Follow MSE for sxs improvement     I have reviewed the patients controlled substance prescription history, as maintained in the [place] prescription monitoring program, so that the prescription(s) for a  controlled substance can be given. Recommendations and Referrals:     Follow up with : MD, requires monitoring of response to medication, requires monitoring of medication side effects. Time until next PMA:     Follow up with Mental Health Clinicians: psychotherapy interventions, improve level of functioning, monitoring to prevent decompensation /hospitalization, monitoring to maintain therapeutic gains, symptoms (resolving and controlled)           Psychotherapy note:                                __10_ Minutes of psychotherapy     [x]  Supportive psychotherapy, Patient discussed certain situational and personal stressors ongoing in her life at this time, weight management d/w the patient. Sleep hygiene d/w patient. Patient allowed to vent out her emotions. Scenarios were reviewed using role playing and CBT techniques in order to increase insight and decrease anxiety. []  Disposition planning      []  Dangerous and will not contract for safety in the community    **Pateint has been notified: They are to call 911 or go to their nearest E.R. if they are experiencing a medical emergency or suicidal ideations/homicidal ideations. **  All ancillary documentation entered reviewed by provider. PLEASE NOTE:  This document has been produced in part or whole using voice recognition software. Proofread however unrecognized errors in transcription may be present.         Marc Olsen MD

## 2022-11-02 RX ORDER — SERTRALINE HYDROCHLORIDE 100 MG/1
TABLET, FILM COATED ORAL
Qty: 90 TABLET | Refills: 1 | OUTPATIENT
Start: 2022-11-02

## 2022-11-04 ENCOUNTER — TELEPHONE (OUTPATIENT)
Dept: ORTHOPEDIC SURGERY | Age: 77
End: 2022-11-04

## 2022-11-07 ENCOUNTER — TELEPHONE (OUTPATIENT)
Dept: ORTHOPEDIC SURGERY | Age: 77
End: 2022-11-07

## 2022-11-07 NOTE — TELEPHONE ENCOUNTER
Lvm for patient , if she is interested in surgery we can get her scheduled for consult with [facility] it would have to be 11/21st and patient would need a 30minute appoinment and or I can go ahead and get a referral going for second opinion.  I did inform her that [facility]UE is out on medical so she would have to consult in office with [facility] if she is interested

## 2022-11-16 ENCOUNTER — TELEPHONE (OUTPATIENT)
Dept: ORTHOPEDIC SURGERY | Age: 77
End: 2022-11-16

## 2022-11-16 NOTE — TELEPHONE ENCOUNTER
Pt called is ready to julian for sx she wants to come in for consult please let me know what time on 11/21/22 [phone]

## 2022-11-28 RX ORDER — BUPROPION HYDROCHLORIDE 300 MG/1
TABLET ORAL
Qty: 90 TABLET | Refills: 1 | OUTPATIENT
Start: 2022-11-28

## 2022-11-30 ENCOUNTER — TELEPHONE (OUTPATIENT)
Dept: FAMILY MEDICINE CLINIC | Facility: CLINIC | Age: 77
End: 2022-11-30

## 2022-11-30 NOTE — TELEPHONE ENCOUNTER
Patient needs her wixela sent to [facility] in [place]. She needs this for her asthma.  She states she spoke with the pharmacy and they said she would need a new rx sent over

## 2022-12-01 ENCOUNTER — TELEPHONE (OUTPATIENT)
Dept: FAMILY MEDICINE CLINIC | Facility: CLINIC | Age: 77
End: 2022-12-01

## 2022-12-01 ENCOUNTER — TELEPHONE (OUTPATIENT)
Dept: PULMONOLOGY | Age: 77
End: 2022-12-01

## 2022-12-01 RX ORDER — OMEPRAZOLE 20 MG/1
20 CAPSULE, DELAYED RELEASE ORAL 2 TIMES DAILY
Qty: 180 CAPSULE | Refills: 3 | Status: SHIPPED | OUTPATIENT
Start: 2022-12-01

## 2022-12-01 NOTE — TELEPHONE ENCOUNTER
----- Message from Caryn Josr sent at 12/1/2022 10:02 AM EST -----  Subject: Refill Request    QUESTIONS  Name of Medication? montelukast (SINGULAIR) 10 MG tablet  Patient-reported dosage and instructions? 10 mg  How many days do you have left? 0  Preferred Pharmacy? [id number]  Pharmacy phone number (if available)? [phone]  ---------------------------------------------------------------------------  --------------,  Name of Medication? Other - advair  Patient-reported dosage and instructions? 5-50 mcg   How many days do you have left? 0  Preferred Pharmacy? [id number]  Pharmacy phone number (if available)? [phone]  ---------------------------------------------------------------------------  --------------  Rosiland Nip INFO  What is the best way for the office to contact you? OK to leave message on   voicemail  Preferred Call Back Phone Number? [phone]  ---------------------------------------------------------------------------  --------------  SCRIPT ANSWERS  Relationship to Patient?  Self

## 2022-12-02 ENCOUNTER — TELEPHONE (OUTPATIENT)
Dept: PULMONOLOGY | Age: 77
End: 2022-12-02

## 2022-12-02 NOTE — TELEPHONE ENCOUNTER
Called to pt . Let her know Per Dr. Boubacar Carvajal that she will need to call her  current pulmonologist at  [address] [place] [address]. Pt voiced understanding.

## 2022-12-05 NOTE — TELEPHONE ENCOUNTER
She states that she wants to talk to Dr. Hanh Barfield nurse. She did not say what this is in regards to. She wanted to apologize and also wants to come back to practice.      Call back [phone]

## 2022-12-06 NOTE — TELEPHONE ENCOUNTER
Patient is calling asking to come back to our practice.   She did not like Doctor she saw at [facility]

## 2022-12-06 NOTE — TELEPHONE ENCOUNTER
PER Mrs. Murillo Sage it is ok to make her an appt. Since she has been seen recently she is still considered an established pt. Thank you!

## 2022-12-07 NOTE — TELEPHONE ENCOUNTER
Patient has appointment on 1/9 with Dr. Bashir Lockett. Is completely out of Advair. Needs this called in asap.

## 2023-01-03 RX ORDER — FLUTICASONE PROPIONATE AND SALMETEROL 500; 50 UG/1; UG/1
POWDER RESPIRATORY (INHALATION)
Qty: 60 EACH | Refills: 3 | OUTPATIENT
Start: 2023-01-03

## 2023-01-18 RX ORDER — OMEPRAZOLE 40 MG/1
CAPSULE, DELAYED RELEASE ORAL
Qty: 90 CAPSULE | Refills: 3 | OUTPATIENT
Start: 2023-01-18

## 2023-02-10 RX ORDER — BUSPIRONE HYDROCHLORIDE 15 MG/1
TABLET ORAL
Qty: 90 TABLET | Refills: 4 | OUTPATIENT
Start: 2023-02-10

## 2023-02-16 ENCOUNTER — TELEMEDICINE (OUTPATIENT)
Dept: BEHAVIORAL/MENTAL HEALTH CLINIC | Age: 78
End: 2023-02-16
Payer: MEDICARE

## 2023-02-16 DIAGNOSIS — F41.8 ANXIETY WITH OBSESSIONAL FEATURES: ICD-10-CM

## 2023-02-16 DIAGNOSIS — F51.05 INSOMNIA DUE TO MENTAL DISORDER: ICD-10-CM

## 2023-02-16 DIAGNOSIS — F40.02 AGORAPHOBIA WITHOUT PANIC: ICD-10-CM

## 2023-02-16 DIAGNOSIS — F33.41 RECURRENT MAJOR DEPRESSIVE DISORDER, IN PARTIAL REMISSION (HCC): Primary | ICD-10-CM

## 2023-02-16 DIAGNOSIS — F41.1 GENERALIZED ANXIETY DISORDER: ICD-10-CM

## 2023-02-16 PROCEDURE — 99442 PR PHYS/QHP TELEPHONE EVALUATION 11-20 MIN: CPT | Performed by: PSYCHIATRY & NEUROLOGY

## 2023-02-16 RX ORDER — BUSPIRONE HYDROCHLORIDE 15 MG/1
15 TABLET ORAL 3 TIMES DAILY
Qty: 30 TABLET | Refills: 3 | Status: SHIPPED | OUTPATIENT
Start: 2023-02-16

## 2023-02-16 RX ORDER — BUPROPION HYDROCHLORIDE 300 MG/1
300 TABLET ORAL EVERY MORNING
Qty: 30 TABLET | Refills: 3 | Status: SHIPPED | OUTPATIENT
Start: 2023-02-16

## 2023-02-16 RX ORDER — SERTRALINE HYDROCHLORIDE 100 MG/1
100 TABLET, FILM COATED ORAL DAILY
Qty: 30 TABLET | Refills: 3 | Status: SHIPPED | OUTPATIENT
Start: 2023-02-16

## 2023-02-16 ASSESSMENT — PATIENT HEALTH QUESTIONNAIRE - PHQ9
5. POOR APPETITE OR OVEREATING: 0
1. LITTLE INTEREST OR PLEASURE IN DOING THINGS: 0
2. FEELING DOWN, DEPRESSED OR HOPELESS: 1
SUM OF ALL RESPONSES TO PHQ9 QUESTIONS 1 & 2: 1
3. TROUBLE FALLING OR STAYING ASLEEP: 0
8. MOVING OR SPEAKING SO SLOWLY THAT OTHER PEOPLE COULD HAVE NOTICED. OR THE OPPOSITE, BEING SO FIGETY OR RESTLESS THAT YOU HAVE BEEN MOVING AROUND A LOT MORE THAN USUAL: 1
SUM OF ALL RESPONSES TO PHQ QUESTIONS 1-9: 7
7. TROUBLE CONCENTRATING ON THINGS, SUCH AS READING THE NEWSPAPER OR WATCHING TELEVISION: 1
SUM OF ALL RESPONSES TO PHQ QUESTIONS 1-9: 7
SUM OF ALL RESPONSES TO PHQ QUESTIONS 1-9: 7
10. IF YOU CHECKED OFF ANY PROBLEMS, HOW DIFFICULT HAVE THESE PROBLEMS MADE IT FOR YOU TO DO YOUR WORK, TAKE CARE OF THINGS AT HOME, OR GET ALONG WITH OTHER PEOPLE: 0
9. THOUGHTS THAT YOU WOULD BE BETTER OFF DEAD, OR OF HURTING YOURSELF: 0
6. FEELING BAD ABOUT YOURSELF - OR THAT YOU ARE A FAILURE OR HAVE LET YOURSELF OR YOUR FAMILY DOWN: 1
4. FEELING TIRED OR HAVING LITTLE ENERGY: 3
SUM OF ALL RESPONSES TO PHQ QUESTIONS 1-9: 7

## 2023-02-16 ASSESSMENT — ANXIETY QUESTIONNAIRES
6. BECOMING EASILY ANNOYED OR IRRITABLE: 0
IF YOU CHECKED OFF ANY PROBLEMS ON THIS QUESTIONNAIRE, HOW DIFFICULT HAVE THESE PROBLEMS MADE IT FOR YOU TO DO YOUR WORK, TAKE CARE OF THINGS AT HOME, OR GET ALONG WITH OTHER PEOPLE: NOT DIFFICULT AT ALL
GAD7 TOTAL SCORE: 1
5. BEING SO RESTLESS THAT IT IS HARD TO SIT STILL: 0
4. TROUBLE RELAXING: 0
7. FEELING AFRAID AS IF SOMETHING AWFUL MIGHT HAPPEN: 0
3. WORRYING TOO MUCH ABOUT DIFFERENT THINGS: 0
2. NOT BEING ABLE TO STOP OR CONTROL WORRYING: 1
1. FEELING NERVOUS, ANXIOUS, OR ON EDGE: 0
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
## 2023-03-03 RX ORDER — BUSPIRONE HYDROCHLORIDE 15 MG/1
TABLET ORAL
Qty: 90 TABLET | Refills: 4 | OUTPATIENT
Start: 2023-03-03

## 2023-03-09 RX ORDER — OMEPRAZOLE 40 MG/1
CAPSULE, DELAYED RELEASE ORAL
Qty: 90 CAPSULE | Refills: 3 | OUTPATIENT
Start: 2023-03-09

## 2023-04-08 RX ORDER — BUSPIRONE HYDROCHLORIDE 15 MG/1
15 TABLET ORAL 3 TIMES DAILY
Qty: 90 TABLET | Refills: 3 | Status: SHIPPED | OUTPATIENT
Start: 2023-04-08

## 2023-04-12 RX ORDER — BUSPIRONE HYDROCHLORIDE 15 MG/1
15 TABLET ORAL 3 TIMES DAILY
Qty: 90 TABLET | Refills: 1 | OUTPATIENT
Start: 2023-04-12

## 2023-04-24 RX ORDER — OMEPRAZOLE 40 MG/1
CAPSULE, DELAYED RELEASE ORAL
Qty: 90 CAPSULE | Refills: 3 | OUTPATIENT
Start: 2023-04-24

## 2023-05-17 ENCOUNTER — TELEPHONE (OUTPATIENT)
Dept: PRIMARY CARE CLINIC | Facility: CLINIC | Age: 78
End: 2023-05-17

## 2023-05-17 RX ORDER — OMEPRAZOLE 40 MG/1
CAPSULE, DELAYED RELEASE ORAL
Qty: 90 CAPSULE | Refills: 3 | OUTPATIENT
Start: 2023-05-17

## 2023-05-17 NOTE — TELEPHONE ENCOUNTER
Patient called to changed her pharmacy,can [facility] please [facility] out [facility]    [facility]  [address] [facility]  Phone: [phone]    Thank you

## 2023-05-18 RX ORDER — BUSPIRONE HYDROCHLORIDE 15 MG/1
15 TABLET ORAL 3 TIMES DAILY
Qty: 90 TABLET | Refills: 3 | OUTPATIENT
Start: 2023-05-18

## 2023-05-31 DIAGNOSIS — Z91.09 ENVIRONMENTAL ALLERGIES: ICD-10-CM

## 2023-06-01 RX ORDER — AZELASTINE HYDROCHLORIDE 137 UG/1
SPRAY, METERED NASAL
Qty: 3 EACH | Refills: 0 | Status: SHIPPED | OUTPATIENT
Start: 2023-06-01

## 2023-06-01 RX ORDER — OMEPRAZOLE 20 MG/1
20 CAPSULE, DELAYED RELEASE ORAL 2 TIMES DAILY
Qty: 90 CAPSULE | Refills: 0 | Status: SHIPPED | OUTPATIENT
Start: 2023-06-01

## 2023-06-01 NOTE — TELEPHONE ENCOUNTER
90 supply sent to pharmacy on meds with no refills. Needs appt before further refills will be sent. Past due for appt.

## 2023-06-02 RX ORDER — BUSPIRONE HYDROCHLORIDE 15 MG/1
15 TABLET ORAL 3 TIMES DAILY
Qty: 90 TABLET | Refills: 3 | OUTPATIENT
Start: 2023-06-02

## 2023-06-02 RX ORDER — BUPROPION HYDROCHLORIDE 300 MG/1
TABLET ORAL
Qty: 30 TABLET | Refills: 3 | OUTPATIENT
Start: 2023-06-02

## 2023-06-05 RX ORDER — TIOTROPIUM BROMIDE INHALATION SPRAY 3.12 UG/1
SPRAY, METERED RESPIRATORY (INHALATION)
Refills: 3 | OUTPATIENT
Start: 2023-06-05

## 2023-06-05 RX ORDER — OMEPRAZOLE 40 MG/1
CAPSULE, DELAYED RELEASE ORAL
Qty: 90 CAPSULE | Refills: 3 | OUTPATIENT
Start: 2023-06-05

## 2023-06-07 ENCOUNTER — TELEPHONE (OUTPATIENT)
Dept: PRIMARY CARE CLINIC | Facility: CLINIC | Age: 78
End: 2023-06-07

## 2023-06-07 RX ORDER — TIOTROPIUM BROMIDE INHALATION SPRAY 3.12 UG/1
SPRAY, METERED RESPIRATORY (INHALATION)
Refills: 3 | OUTPATIENT
Start: 2023-06-07

## 2023-06-12 RX ORDER — TIOTROPIUM BROMIDE INHALATION SPRAY 3.12 UG/1
SPRAY, METERED RESPIRATORY (INHALATION)
Refills: 3 | OUTPATIENT
Start: 2023-06-12

## 2023-06-12 RX ORDER — OMEPRAZOLE 40 MG/1
CAPSULE, DELAYED RELEASE ORAL
Qty: 90 CAPSULE | Refills: 3 | OUTPATIENT
Start: 2023-06-12

## 2023-06-19 ENCOUNTER — TELEMEDICINE (OUTPATIENT)
Dept: BEHAVIORAL/MENTAL HEALTH CLINIC | Age: 78
End: 2023-06-19
Payer: MEDICARE

## 2023-06-19 DIAGNOSIS — F41.8 ANXIETY WITH OBSESSIONAL FEATURES: ICD-10-CM

## 2023-06-19 DIAGNOSIS — F41.1 GENERALIZED ANXIETY DISORDER: ICD-10-CM

## 2023-06-19 DIAGNOSIS — F51.05 INSOMNIA DUE TO MENTAL DISORDER: ICD-10-CM

## 2023-06-19 DIAGNOSIS — F40.02 AGORAPHOBIA WITHOUT PANIC: ICD-10-CM

## 2023-06-19 DIAGNOSIS — F33.2 SEVERE EPISODE OF RECURRENT MAJOR DEPRESSIVE DISORDER, WITHOUT PSYCHOTIC FEATURES (HCC): Primary | ICD-10-CM

## 2023-06-19 PROBLEM — C56.3 MALIGNANT NEOPLASM OF BOTH OVARIES (HCC): Status: ACTIVE | Noted: 2019-03-27

## 2023-06-19 PROBLEM — N83.8 OVARIAN MASS: Status: ACTIVE | Noted: 2019-02-13

## 2023-06-19 PROCEDURE — 99441 PR PHYS/QHP TELEPHONE EVALUATION 5-10 MIN: CPT | Performed by: PSYCHIATRY & NEUROLOGY

## 2023-06-19 RX ORDER — BUPROPION HYDROCHLORIDE 150 MG/1
TABLET ORAL
Qty: 30 TABLET | Refills: 0 | Status: SHIPPED | OUTPATIENT
Start: 2023-06-19

## 2023-06-19 RX ORDER — SERTRALINE HYDROCHLORIDE 100 MG/1
100 TABLET, FILM COATED ORAL DAILY
Qty: 30 TABLET | Refills: 0 | Status: SHIPPED | OUTPATIENT
Start: 2023-06-19

## 2023-06-19 ASSESSMENT — ANXIETY QUESTIONNAIRES
7. FEELING AFRAID AS IF SOMETHING AWFUL MIGHT HAPPEN: 3
4. TROUBLE RELAXING: 3
2. NOT BEING ABLE TO STOP OR CONTROL WORRYING: 3
1. FEELING NERVOUS, ANXIOUS, OR ON EDGE: 3
3. WORRYING TOO MUCH ABOUT DIFFERENT THINGS: 3
6. BECOMING EASILY ANNOYED OR IRRITABLE: 3
IF YOU CHECKED OFF ANY PROBLEMS ON THIS QUESTIONNAIRE, HOW DIFFICULT HAVE THESE PROBLEMS MADE IT FOR YOU TO DO YOUR WORK, TAKE CARE OF THINGS AT HOME, OR GET ALONG WITH OTHER PEOPLE: EXTREMELY DIFFICULT
GAD7 TOTAL SCORE: 21
5. BEING SO RESTLESS THAT IT IS HARD TO SIT STILL: 3

## 2023-06-19 ASSESSMENT — PATIENT HEALTH QUESTIONNAIRE - PHQ9
10. IF YOU CHECKED OFF ANY PROBLEMS, HOW DIFFICULT HAVE THESE PROBLEMS MADE IT FOR YOU TO DO YOUR WORK, TAKE CARE OF THINGS AT HOME, OR GET ALONG WITH OTHER PEOPLE: 2
7. TROUBLE CONCENTRATING ON THINGS, SUCH AS READING THE NEWSPAPER OR WATCHING TELEVISION: 2
1. LITTLE INTEREST OR PLEASURE IN DOING THINGS: 3
3. TROUBLE FALLING OR STAYING ASLEEP: 3
5. POOR APPETITE OR OVEREATING: 3
SUM OF ALL RESPONSES TO PHQ QUESTIONS 1-9: 20
8. MOVING OR SPEAKING SO SLOWLY THAT OTHER PEOPLE COULD HAVE NOTICED. OR THE OPPOSITE, BEING SO FIGETY OR RESTLESS THAT YOU HAVE BEEN MOVING AROUND A LOT MORE THAN USUAL: 0
4. FEELING TIRED OR HAVING LITTLE ENERGY: 3
SUM OF ALL RESPONSES TO PHQ9 QUESTIONS 1 & 2: 6
6. FEELING BAD ABOUT YOURSELF - OR THAT YOU ARE A FAILURE OR HAVE LET YOURSELF OR YOUR FAMILY DOWN: 3
SUM OF ALL RESPONSES TO PHQ QUESTIONS 1-9: 20
2. FEELING DOWN, DEPRESSED OR HOPELESS: 3
SUM OF ALL RESPONSES TO PHQ QUESTIONS 1-9: 20
SUM OF ALL RESPONSES TO PHQ QUESTIONS 1-9: 20
9. THOUGHTS THAT YOU WOULD BE BETTER OFF DEAD, OR OF HURTING YOURSELF: 0

## 2023-06-19 ASSESSMENT — COLUMBIA-SUICIDE SEVERITY RATING SCALE - C-SSRS
2. HAVE YOU ACTUALLY HAD ANY THOUGHTS OF KILLING YOURSELF?: NO
1. WITHIN THE PAST MONTH, HAVE YOU WISHED YOU WERE DEAD OR WISHED YOU COULD GO TO SLEEP AND NOT WAKE UP?: NO
6. HAVE YOU EVER DONE ANYTHING, STARTED TO DO ANYTHING, OR PREPARED TO DO ANYTHING TO END YOUR LIFE?: NO

## 2023-06-19 NOTE — PROGRESS NOTES
Patient:  Rachael Benoit  Age:  69 y.o.  :  1945     SEX:  female MRN:  [id number]     RACE: White (non-)     SEEN:  [x]  PATIENT  []  SPOUSE []  OTHER:              PHQ-9  2023 2023 10/31/2022   Little interest or pleasure in doing things 3 0 0   Little interest or pleasure in doing things - - -   Feeling down, depressed, or hopeless 3 1 0   Trouble falling or staying asleep, or sleeping too much 3 0 0   Trouble falling or staying asleep, or sleeping too much - - -   Feeling tired or having little energy 3 3 1   Feeling tired or having little energy - - -   Poor appetite or overeating 3 0 0   Poor appetite, weight loss, or overeating - - -   Feeling bad about yourself - or that you are a failure or have let yourself or your family down 3 1 0   Feeling bad about yourself - or that you are a failure or have let yourself or your family down - - -   Trouble concentrating on things, such as reading the newspaper or watching television 2 1 0   Trouble concentrating on things such as school, work, reading, or watching TV - - -   Moving or speaking so slowly that other people could have noticed. Or the opposite - being so fidgety or restless that you have been moving around a lot more than usual 0 1 0   Moving or speaking so slowly that other people could have noticed; or the opposite being so fidgety that others notice - - -   Thoughts that you would be better off dead, or of hurting yourself in some way 0 0 0   Thoughts of being better off dead, or hurting yourself in some way - - -   PHQ-2 Score 6 1 0   Total Score PHQ 2 - - -   PHQ-9 Total Score 20 7 1   PHQ 9 Score - - -   If you checked off any problems, how difficult have these problems made it for you to do your work, take care of things at home, or get along with other people?  2 0 0   How difficult have these problems made it for you to do your work, take care of your home and get along with others - - -       JAVAD-7

## 2023-07-05 RX ORDER — BUPROPION HYDROCHLORIDE 150 MG/1
TABLET ORAL
Qty: 30 TABLET | Refills: 0 | OUTPATIENT
Start: 2023-07-05

## 2023-07-11 ENCOUNTER — OFFICE VISIT (OUTPATIENT)
Dept: BEHAVIORAL/MENTAL HEALTH CLINIC | Age: 78
End: 2023-07-11
Payer: MEDICARE

## 2023-07-11 VITALS
SYSTOLIC BLOOD PRESSURE: 136 MMHG | HEIGHT: 60 IN | BODY MASS INDEX: 35.14 KG/M2 | WEIGHT: 179 LBS | HEART RATE: 80 BPM | DIASTOLIC BLOOD PRESSURE: 80 MMHG | TEMPERATURE: 98.4 F | OXYGEN SATURATION: 96 %

## 2023-07-11 DIAGNOSIS — F51.05 INSOMNIA DUE TO MENTAL DISORDER: ICD-10-CM

## 2023-07-11 DIAGNOSIS — F33.0 MILD EPISODE OF RECURRENT MAJOR DEPRESSIVE DISORDER (HCC): Primary | ICD-10-CM

## 2023-07-11 DIAGNOSIS — F40.02 AGORAPHOBIA WITHOUT PANIC: ICD-10-CM

## 2023-07-11 DIAGNOSIS — F41.8 ANXIETY WITH OBSESSIONAL FEATURES: ICD-10-CM

## 2023-07-11 DIAGNOSIS — F41.1 GENERALIZED ANXIETY DISORDER: ICD-10-CM

## 2023-07-11 PROCEDURE — 99214 OFFICE O/P EST MOD 30 MIN: CPT | Performed by: PSYCHIATRY & NEUROLOGY

## 2023-07-11 PROCEDURE — 1036F TOBACCO NON-USER: CPT | Performed by: PSYCHIATRY & NEUROLOGY

## 2023-07-11 PROCEDURE — 1123F ACP DISCUSS/DSCN MKR DOCD: CPT | Performed by: PSYCHIATRY & NEUROLOGY

## 2023-07-11 PROCEDURE — 1090F PRES/ABSN URINE INCON ASSESS: CPT | Performed by: PSYCHIATRY & NEUROLOGY

## 2023-07-11 PROCEDURE — G8417 CALC BMI ABV UP PARAM F/U: HCPCS | Performed by: PSYCHIATRY & NEUROLOGY

## 2023-07-11 PROCEDURE — G8399 PT W/DXA RESULTS DOCUMENT: HCPCS | Performed by: PSYCHIATRY & NEUROLOGY

## 2023-07-11 PROCEDURE — G8427 DOCREV CUR MEDS BY ELIG CLIN: HCPCS | Performed by: PSYCHIATRY & NEUROLOGY

## 2023-07-11 RX ORDER — BUPROPION HYDROCHLORIDE 150 MG/1
TABLET ORAL
Qty: 30 TABLET | Refills: 3 | Status: SHIPPED | OUTPATIENT
Start: 2023-07-11

## 2023-07-11 RX ORDER — SERTRALINE HYDROCHLORIDE 100 MG/1
100 TABLET, FILM COATED ORAL DAILY
Qty: 30 TABLET | Refills: 3 | Status: SHIPPED | OUTPATIENT
Start: 2023-07-11

## 2023-07-11 RX ORDER — BUSPIRONE HYDROCHLORIDE 15 MG/1
15 TABLET ORAL 3 TIMES DAILY
Qty: 90 TABLET | Refills: 3 | Status: SHIPPED | OUTPATIENT
Start: 2023-07-11

## 2023-07-11 ASSESSMENT — PATIENT HEALTH QUESTIONNAIRE - PHQ9
10. IF YOU CHECKED OFF ANY PROBLEMS, HOW DIFFICULT HAVE THESE PROBLEMS MADE IT FOR YOU TO DO YOUR WORK, TAKE CARE OF THINGS AT HOME, OR GET ALONG WITH OTHER PEOPLE: 2
SUM OF ALL RESPONSES TO PHQ QUESTIONS 1-9: 14
SUM OF ALL RESPONSES TO PHQ9 QUESTIONS 1 & 2: 1
SUM OF ALL RESPONSES TO PHQ QUESTIONS 1-9: 14
7. TROUBLE CONCENTRATING ON THINGS, SUCH AS READING THE NEWSPAPER OR WATCHING TELEVISION: 3
3. TROUBLE FALLING OR STAYING ASLEEP: 3
SUM OF ALL RESPONSES TO PHQ QUESTIONS 1-9: 14
8. MOVING OR SPEAKING SO SLOWLY THAT OTHER PEOPLE COULD HAVE NOTICED. OR THE OPPOSITE, BEING SO FIGETY OR RESTLESS THAT YOU HAVE BEEN MOVING AROUND A LOT MORE THAN USUAL: 0
4. FEELING TIRED OR HAVING LITTLE ENERGY: 3
1. LITTLE INTEREST OR PLEASURE IN DOING THINGS: 0
2. FEELING DOWN, DEPRESSED OR HOPELESS: 1
6. FEELING BAD ABOUT YOURSELF - OR THAT YOU ARE A FAILURE OR HAVE LET YOURSELF OR YOUR FAMILY DOWN: 3
9. THOUGHTS THAT YOU WOULD BE BETTER OFF DEAD, OR OF HURTING YOURSELF: 0
5. POOR APPETITE OR OVEREATING: 1
SUM OF ALL RESPONSES TO PHQ QUESTIONS 1-9: 14

## 2023-07-11 ASSESSMENT — MINI MENTAL STATE EXAM
SAY: I AM GOING TO NAME THREE OBJECTS. WHEN I AM FINISHED, I WANT YOU TO REPEAT
THEM. REMEMBER WHAT THEY ARE BECAUSE I AM GOING TO ASK YOU TO NAME THEM AGAIN IN
A FEW MINUTES.  SAY THE FOLLOWING WORDS SLOWLY AT 1-SECOND INTERVALS - BALL/ CAR/ MAN [ITERATIONS FOR REPEAT ADMINISTRATION]: 3
NOW WHAT WERE THE THREE OBJECTS I ASKED YOU TO REMEMBER?: 3
WHAT STATE [OR PROVINCE] ARE WE IN?: 1
WHAT YEAR IS THIS?: 1
SAY: PUT THE PAPER DOWN ON THE FLOOR, SCORE IF PAPER IS PLACED BACK ON FLOOR: 1
SAY: I WOULD LIKE YOU TO REPEAT THIS PHRASE AFTER ME: NO IFS, ANDS, OR BUTS.: 1
SHOW: PENCIL [OBJECT] ASK: WHAT IS THIS CALLED?: 1
SAY: READ THE WORDS ON THE PAGE AND THEN DO WHAT IT SAYS. THEN HAND THE PERSON
THE SHEET WITH CLOSE YOUR EYES ON IT. IF THE SUBJECT READS AND DOES NOT CLOSE THEIR EYES, REPEAT UP TO THREE TIMES. SCORE ONLY IF SUBJECT CLOSES EYES.: 1
WHAT DAY OF THE WEEK IS THIS?: 1
WHAT IS TODAY'S DATE?: 0
HAND THE PERSON A PENCIL AND PAPER. SAY: WRITE ANY COMPLETE SENTENCE ON THAT
PIECE OF PAPER. (NOTE: THE SENTENCE MUST MAKE SENSE. IGNORE SPELLING ERRORS): 1
WHAT FLOOR ARE WE ON [IN FACILITY]?/ WHAT ROOM ARE WE IN [IN HOME]?: 1
SHOW: WRISTWATCH [OBJECT] ASK: WHAT IS THIS CALLED?: 1
WHAT MONTH IS THIS?: 1
WHAT IS THE NAME OF THIS BUILDING [IN FACILITY]?/WHAT IS THE STREET ADDRESS OF THIS HOUSE [IN HOME]?: 1
ASK THE PERSON IF HE IS RIGHT OR LEFT-HANDED. TAKE A PIECE OF PAPER AND HOLD IT UP IN
FRONT OF THE PERSON. SAY: TAKE THIS PAPER IN YOUR RIGHT/LEFT HAND (WHICHEVER IS NON-
DOMINANT), SCORE IF PAPER IS PICKED UP IN CORRECT HAND.: 1
SAY: FOLD THE PAPER IN HALF ONCE WITH BOTH HANDS, SCORE IF PAPER IS CORRECTLY FOLDED IN HALF.: 1
SUM ALL MMSE QUESTIONS FOR TOTAL SCORE [OUT OF 30].: 29
WHICH SEASON IS THIS?: 1
PLACE DESIGN, ERASER AND PENCIL IN FRONT OF THE PERSON.  SAY:  COPY THIS DESIGN PLEASE.  SHOW: DESIGN. ALLOW: MULTIPLE TRIES. WAIT UNTIL PERSON IS FINISHED AND HANDS IT BACK. SCORE: ONLY FOR DIAGRAM WITH 4-SIDED FIGURE BETWEEN TWO 5-SIDED FIGURES: 1
SAY: I WOULD LIKE YOU TO COUNT BACKWARD FROM 100 BY SEVENS: 5
WHAT CITY/TOWN ARE WE IN?: 1
WHAT COUNTRY ARE WE IN?: 1

## 2023-07-11 ASSESSMENT — ANXIETY QUESTIONNAIRES
3. WORRYING TOO MUCH ABOUT DIFFERENT THINGS: 3
4. TROUBLE RELAXING: 1
6. BECOMING EASILY ANNOYED OR IRRITABLE: 0
GAD7 TOTAL SCORE: 8
7. FEELING AFRAID AS IF SOMETHING AWFUL MIGHT HAPPEN: 0
IF YOU CHECKED OFF ANY PROBLEMS ON THIS QUESTIONNAIRE, HOW DIFFICULT HAVE THESE PROBLEMS MADE IT FOR YOU TO DO YOUR WORK, TAKE CARE OF THINGS AT HOME, OR GET ALONG WITH OTHER PEOPLE: NOT DIFFICULT AT ALL
1. FEELING NERVOUS, ANXIOUS, OR ON EDGE: 1
5. BEING SO RESTLESS THAT IT IS HARD TO SIT STILL: 0
2. NOT BEING ABLE TO STOP OR CONTROL WORRYING: 3

## 2023-07-11 ASSESSMENT — COLUMBIA-SUICIDE SEVERITY RATING SCALE - C-SSRS
6. HAVE YOU EVER DONE ANYTHING, STARTED TO DO ANYTHING, OR PREPARED TO DO ANYTHING TO END YOUR LIFE?: NO
2. HAVE YOU ACTUALLY HAD ANY THOUGHTS OF KILLING YOURSELF?: NO
1. WITHIN THE PAST MONTH, HAVE YOU WISHED YOU WERE DEAD OR WISHED YOU COULD GO TO SLEEP AND NOT WAKE UP?: NO

## 2023-07-11 NOTE — PROGRESS NOTES
Patient:  Lisa Mendiola  Age:  69 y.o.  :  1945     SEX:  female MRN:  [id number]     RACE: White (non-)     SEEN:  [x]  PATIENT  []  SPOUSE []  OTHER:              PHQ-9  2023   Little interest or pleasure in doing things 0 3 0   Little interest or pleasure in doing things - - -   Feeling down, depressed, or hopeless 1 3 1   Trouble falling or staying asleep, or sleeping too much 3 3 0   Trouble falling or staying asleep, or sleeping too much - - -   Feeling tired or having little energy 3 3 3   Feeling tired or having little energy - - -   Poor appetite or overeating 1 3 0   Poor appetite, weight loss, or overeating - - -   Feeling bad about yourself - or that you are a failure or have let yourself or your family down 3 3 1   Feeling bad about yourself - or that you are a failure or have let yourself or your family down - - -   Trouble concentrating on things, such as reading the newspaper or watching television 3 2 1   Trouble concentrating on things such as school, work, reading, or watching TV - - -   Moving or speaking so slowly that other people could have noticed. Or the opposite - being so fidgety or restless that you have been moving around a lot more than usual 0 0 1   Moving or speaking so slowly that other people could have noticed; or the opposite being so fidgety that others notice - - -   Thoughts that you would be better off dead, or of hurting yourself in some way 0 0 0   Thoughts of being better off dead, or hurting yourself in some way - - -   PHQ-2 Score 1 6 1   Total Score PHQ 2 - - -   PHQ-9 Total Score 14 20 7   PHQ 9 Score - - -   If you checked off any problems, how difficult have these problems made it for you to do your work, take care of things at home, or get along with other people?  2 2 0   How difficult have these problems made it for you to do your work, take care of your home and get along with others - - -       JAVAD-7

## 2023-07-21 RX ORDER — OMEPRAZOLE 20 MG/1
CAPSULE, DELAYED RELEASE ORAL
Qty: 90 CAPSULE | Refills: 0 | OUTPATIENT
Start: 2023-07-21

## 2023-08-18 ENCOUNTER — TELEPHONE (OUTPATIENT)
Dept: BEHAVIORAL/MENTAL HEALTH CLINIC | Age: 78
End: 2023-08-18

## 2023-08-18 RX ORDER — BUPROPION HYDROCHLORIDE 300 MG/1
300 TABLET ORAL EVERY MORNING
Qty: 30 TABLET | Refills: 3 | Status: SHIPPED | OUTPATIENT
Start: 2023-08-18

## 2023-08-29 DIAGNOSIS — R05.3 CHRONIC COUGH: ICD-10-CM

## 2023-08-29 DIAGNOSIS — J45.909 UNCOMPLICATED ASTHMA, UNSPECIFIED ASTHMA SEVERITY, UNSPECIFIED WHETHER PERSISTENT: Primary | ICD-10-CM

## 2023-09-13 ENCOUNTER — TELEPHONE (OUTPATIENT)
Dept: PRIMARY CARE CLINIC | Facility: CLINIC | Age: 78
End: 2023-09-13

## 2023-09-15 ENCOUNTER — TELEPHONE (OUTPATIENT)
Dept: BEHAVIORAL/MENTAL HEALTH CLINIC | Age: 78
End: 2023-09-15

## 2023-09-15 NOTE — TELEPHONE ENCOUNTER
Patient stated that she had a disagreement with Dr Noemi Goddard at the last appt and she called and wanted to pass the message on that she wanted to sincerely apologize to her and that she does still want to be her patient.

## 2023-09-28 ENCOUNTER — TELEPHONE (OUTPATIENT)
Dept: ONCOLOGY | Age: 78
End: 2023-09-28

## 2023-09-28 NOTE — TELEPHONE ENCOUNTER
Pt requested to Cx appt. Pt stated issue has not been resolved but she \"just wants to Cx. \" Pt declined to RS at this time 30

## 2023-09-28 NOTE — TELEPHONE ENCOUNTER
Called to confirm pt's wish to cancel. Pt stated \"spots\" were like bruises and on arms but not vaginally or on abdomin. Stated she did not wish to come in tomorrow and would call if she needed anything. Advised pt to follow up with PCP for bruising. Pt AYESHA.

## 2023-10-09 ENCOUNTER — TELEPHONE (OUTPATIENT)
Dept: PULMONOLOGY | Age: 78
End: 2023-10-09

## 2023-10-09 DIAGNOSIS — R05.3 CHRONIC COUGH: Primary | ICD-10-CM

## 2023-10-09 RX ORDER — BUPROPION HYDROCHLORIDE 150 MG/1
TABLET ORAL
Qty: 30 TABLET | Refills: 3 | OUTPATIENT
Start: 2023-10-09

## 2023-10-09 NOTE — TELEPHONE ENCOUNTER
Patient is asking for Dr. Naima Sweeney to put an order in for her to get a CXR tomorrow before her other appts.  She says it has been a while since she has been in and feels better having this also for Dr. Naima Sweeney to see

## 2023-10-12 NOTE — TELEPHONE ENCOUNTER
Spoke directly to Dr Storm Pereyra and she said can order CXR prior to appt. Order placed. Called patient and voice mail not set up and other numbers in computer is disconnected.

## 2023-10-24 ENCOUNTER — TELEPHONE (OUTPATIENT)
Dept: BEHAVIORAL/MENTAL HEALTH CLINIC | Age: 78
End: 2023-10-24

## 2023-10-24 NOTE — TELEPHONE ENCOUNTER
Patient is sick with diarrhea, cough and fever blisters on mouth and feels she has a virus. Rescheduled for 1/25. Requesting refills to last until she can be seen.

## 2023-10-25 RX ORDER — BUSPIRONE HYDROCHLORIDE 15 MG/1
15 TABLET ORAL 3 TIMES DAILY
Qty: 90 TABLET | Refills: 3 | Status: SHIPPED | OUTPATIENT
Start: 2023-10-25

## 2023-10-25 RX ORDER — SERTRALINE HYDROCHLORIDE 100 MG/1
100 TABLET, FILM COATED ORAL DAILY
Qty: 30 TABLET | Refills: 3 | Status: SHIPPED | OUTPATIENT
Start: 2023-10-25

## 2023-10-25 RX ORDER — LEVOMEFOLATE CALCIUM 7.5 MG TABLET
7.5 TABLET DAILY
Qty: 30 TABLET | Refills: 3 | Status: SHIPPED | OUTPATIENT
Start: 2023-10-25

## 2023-10-25 RX ORDER — BUPROPION HYDROCHLORIDE 300 MG/1
300 TABLET ORAL EVERY MORNING
Qty: 30 TABLET | Refills: 3 | Status: SHIPPED | OUTPATIENT
Start: 2023-10-25

## 2023-10-25 NOTE — TELEPHONE ENCOUNTER
Patient notified. Stated that she has been trying to schedule an appt for PCP but they have not been returning her calls to schedule. Stated that she has called 4 times but will try again.

## 2023-11-06 RX ORDER — BUSPIRONE HYDROCHLORIDE 15 MG/1
TABLET ORAL
Qty: 90 TABLET | Refills: 3 | OUTPATIENT
Start: 2023-11-06

## 2023-12-14 RX ORDER — SERTRALINE HYDROCHLORIDE 100 MG/1
100 TABLET, FILM COATED ORAL DAILY
Qty: 30 TABLET | Refills: 3 | OUTPATIENT
Start: 2023-12-14

## 2024-02-06 ENCOUNTER — TELEPHONE (OUTPATIENT)
Dept: FAMILY MEDICINE CLINIC | Facility: CLINIC | Age: 79
End: 2024-02-06

## 2024-02-06 NOTE — TELEPHONE ENCOUNTER
Ecc call today requesting  to call back patient for a urgent appointment. I called Mrs Long she stated she want to switch to another provider  because Dr Beasley is too busy , always crowded, and she does not like it, she also  stated  she has pink eye but  she will  go to Urgent Care today . I told Mrs. Long I call her back after I speak with my  regard the protocol to follow in order to change provider as she requested today.

## 2024-03-10 ENCOUNTER — APPOINTMENT (OUTPATIENT)
Dept: CT IMAGING | Age: 79
End: 2024-03-10
Payer: COMMERCIAL

## 2024-03-10 ENCOUNTER — HOSPITAL ENCOUNTER (EMERGENCY)
Age: 79
Discharge: HOME | End: 2024-03-10
Payer: COMMERCIAL

## 2024-03-10 VITALS
HEIGHT: 62 IN | OXYGEN SATURATION: 95 % | BODY MASS INDEX: 33.13 KG/M2 | TEMPERATURE: 98.3 F | SYSTOLIC BLOOD PRESSURE: 136 MMHG | WEIGHT: 180 LBS | HEART RATE: 83 BPM | RESPIRATION RATE: 18 BRPM | DIASTOLIC BLOOD PRESSURE: 52 MMHG

## 2024-03-10 DIAGNOSIS — S09.90XA CLOSED HEAD INJURY, INITIAL ENCOUNTER: Primary | ICD-10-CM
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
## 2024-03-10 PROCEDURE — 72125 CT NECK SPINE W/O DYE: CPT

## 2024-03-10 PROCEDURE — 70450 CT HEAD/BRAIN W/O DYE: CPT

## 2024-03-10 PROCEDURE — 99284 EMERGENCY DEPT VISIT MOD MDM: CPT

## 2024-03-10 ASSESSMENT — PAIN - FUNCTIONAL ASSESSMENT: PAIN_FUNCTIONAL_ASSESSMENT: 0-10

## 2024-03-10 ASSESSMENT — PAIN SCALES - GENERAL: PAINLEVEL_OUTOF10: 4

## 2024-03-10 ASSESSMENT — PAIN DESCRIPTION - LOCATION: LOCATION: HEAD

## 2024-03-10 NOTE — ED TRIAGE NOTES
Pt CO R side of forehead pain after falling and hit head on cement floor x2 weeks ago. Pt denies LOC or taking blood thinners. Pt ambulatory to triage.

## 2024-03-10 NOTE — ED NOTES
I have reviewed discharge instructions with the patient.  The patient verbalized understanding.    Patient left ED via Discharge Method: ambulatory to Home with self.    Opportunity for questions and clarification provided.       Patient given 0 scripts.         To continue your aftercare when you leave the hospital, you may receive an automated call from our care team to check in on how you are doing.  This is a free service and part of our promise to provide the best care and service to meet your aftercare needs.” If you have questions, or wish to unsubscribe from this service please call [phone].  Thank you for Choosing our [facility] Emergency Department.

## 2024-03-10 NOTE — DISCHARGE INSTRUCTIONS
Your CAT scans did not show any signs of bleeding in the brain or fractures or broken bones.  You may continue using Tylenol and Motrin as needed for pain.  Follow-up with your family doctor.    As we discussed, I did not find a life threatening cause of your symptoms today. However, THAT DOES NOT MEAN IT COULD NOT DEVELOP. If you develop ANY new or worsening symptoms, it is critical that you return for re-evaluation. This includes any symptoms that are concerning to you, especially symptoms such as worst headache of life, unilateral weakness or numbness, dizziness, double vision.  If you do not return for re-evaluation, you risk serious complications, including death.

## 2024-03-10 NOTE — ED PROVIDER NOTES
Emergency Department Provider Note       PCP: Jose Beasley DO   Age: 78 y.o.   Sex: female     DISPOSITION Decision To Discharge 03/10/2024 03:25:31 PM       ICD-10-CM    1. Closed head injury, initial encounter  S09.90XA       2. Fall, initial encounter  W19.XXXA           Medical Decision Making     In summary this is a 78-year-old female patient presented for evaluation of fall that occurred 2 weeks ago.  Has been having some intermittent headaches ever since.  Neurologically intact here.  No signs of basilar skull fracture.  CAT scans negative.  Will discharge with outpatient follow-up.  Counseled on signs to return here for.  The patient has verbalized understanding and agreed with the plan.  Discharged in stable condition.     1 acute, uncomplicated illness or injury.  Over the counter drug management performed.  Patient was discharged risks and benefits of hospitalization were considered.  Chronic medical problems impacting care include asthma.  The following clinical decision tools were used in the care of this patient Sudanese Head CT rule.  Shared medical decision making was utilized in creating the patients health plan today.  ED attending physician present in department at time of care. Based on current hospital policy, their co-signature is not required on this note.   I independently ordered and reviewed each unique test.       I interpreted the CT Scan no bleed.  RADIOLOGY DISCLAIMER: Although I do my best to interpret the imaging, I am not a board-certified radiologist.  I am still basing my medical decision making off of the board-certified radiology interpretation provided to me.        Patient has minor blunt head trauma and head CT was ordered for the following reason Bhutanese head ct positive        History     78-year-old female patient presents today complaining of intermittent top of head and forehead pain ongoing for the past 2 weeks.  Reports it started shortly after a mechanical

## 2024-03-18 RX ORDER — TIOTROPIUM BROMIDE INHALATION SPRAY 3.12 UG/1
SPRAY, METERED RESPIRATORY (INHALATION)
Refills: 3 | OUTPATIENT
Start: 2024-03-18

## 2024-04-08 NOTE — PROGRESS NOTES
Medicare Annual Wellness Visit    Yenny Hastings is here for Medicare AWV and Discuss Labs    Assessment & Plan   Routine general medical examination at a health care facility  Elevated TSH  -     TSH; Future  -     T3; Future  -     T4; Future  GERD without esophagitis  -     omeprazole (PRILOSEC) 40 MG delayed release capsule; TAKE 1 CAPSULE BY MOUTH EVERY DAY, Disp-90 capsule, R-3Normal  Environmental allergies  -     Azelastine HCl 137 MCG/SPRAY SOLN; 1 SPRAY BY NASAL ROUTE 2 TIMES DAILY USE IN EACH NOSTRIL AS DIRECTED, Disp-1 each, R-11Normal  -     montelukast (SINGULAIR) 10 MG tablet; TAKE 1 TABLET BY MOUTH NIGHTLY., Disp-90 tablet, R-3Normal  Pure hypercholesterolemia  -     Lipid Panel; Future  Encounter for Medicare annual wellness exam  Screening for depression  Medicare annual wellness visit, subsequent  Essential (primary) hypertension  Postmenopausal  -     DEXA BONE DENSITY AXIAL SKELETON; Future    Recommendations for Preventive Services Due: see orders and patient instructions/AVS.  Recommended screening schedule for the next 5-10 years is provided to the patient in written form: see Patient Instructions/AVS.     Return for Medicare Annual Wellness Visit in 1 year. Subjective   Patient presents office today for physical/Medicare wellness visit    Patient's complete Health Risk Assessment and screening values have been reviewed and are found in Flowsheets. The following problems were reviewed today and where indicated follow up appointments were made and/or referrals ordered.     Positive Risk Factor Screenings with Interventions:    Fall Risk:  Do you feel unsteady or are you worried about falling? : (!) yes  2 or more falls in past year?: (!) yes  Fall with injury in past year?: no   Fall Risk Interventions:    No fall risk interventions needed             Health Habits/Nutrition:  Physical Activity: Not on file        Body mass index: (!) 34.95     Health Habits/Nutrition This is a DR. Cabrera patient. Not a new patient for me. Do we need to move them to Dr. Cabrera?   Interventions:  No health habits nutrition interventions needed other than diet exercise and weight loss             Objective   Vitals:    10/11/22 0857   BP: (!) 160/100   Site: Left Upper Arm   Position: Sitting   Cuff Size: Small Adult   Weight: 179 lb (81.2 kg)   Height: 5' (1.524 m)      Body mass index is 34.96 kg/m². General Appearance: alert and oriented to person, place and time, well developed and well- nourished, in no acute distress  Skin: warm and dry, no rash or erythema  Head: normocephalic and atraumatic  Eyes: pupils equal, round, and reactive to light, extraocular eye movements intact, conjunctivae normal  ENT: tympanic membrane, external ear and ear canal normal bilaterally, nose without deformity, nasal mucosa and turbinates normal without polyps  Neck: supple and non-tender without mass, no thyromegaly or thyroid nodules, no cervical lymphadenopathy  Pulmonary/Chest: clear to auscultation bilaterally- no wheezes, rales or rhonchi, normal air movement, no respiratory distress  Cardiovascular: normal rate, regular rhythm, normal S1 and S2, no murmurs, rubs, clicks, or gallops, distal pulses intact, no carotid bruits  Abdomen: soft, non-tender, non-distended, normal bowel sounds, no masses or organomegaly  Extremities: no cyanosis, clubbing or edema  Musculoskeletal: normal range of motion, no joint swelling, deformity or tenderness  Neurologic: reflexes normal and symmetric, no cranial nerve deficit, gait, coordination and speech normal       Allergies   Allergen Reactions    Eggs Or Egg-Derived Products Shortness Of Breath     Allergy to egg whites, asthma reaction    Milk Protein Shortness Of Breath     Allergy to cow's milk    Peanut (Diagnostic) Other (See Comments) and Shortness Of Breath    Pollen Extract Shortness Of Breath    Lac Bovis Other (See Comments)     \"cows milk\"     Prior to Visit Medications    Medication Sig Taking?  Authorizing Provider   omeprazole (PRILOSEC) 40 MG delayed release capsule TAKE 1 CAPSULE BY MOUTH EVERY DAY Yes Jose Beasley DO   Azelastine HCl 137 MCG/SPRAY SOLN 1 SPRAY BY NASAL ROUTE 2 TIMES DAILY USE IN EACH NOSTRIL AS DIRECTED Yes Jose Beasley DO   montelukast (SINGULAIR) 10 MG tablet TAKE 1 TABLET BY MOUTH NIGHTLY. Yes Patt Beasley DO   buPROPion (WELLBUTRIN XL) 300 MG extended release tablet Take 1 tablet by mouth every morning Yes Leonid Kwok MD   busPIRone (BUSPAR) 15 MG tablet Take 15 mg by mouth in the morning and 15 mg at noon and 15 mg before bedtime. TAKE 1 TABLET BY MOUTH THREE TIMES A DAY. Yes Leonid Kwok MD   sertraline (ZOLOFT) 100 MG tablet Take 1 tablet by mouth in the morning. Yes Leonid Kwok MD   [place] Lose INHUB 500-50 MCG/ACT AEPB diskus inhaler INHALE 1 PUFF BY MOUTH TWICE A DAY Yes Pallavi Freeman MD   SPIRIVA RESPIMAT 2.5 MCG/ACT AERS inhaler TAKE 2 PUFFS BY INHALATION DAILY. Yes Pallavi Freeman MD   famotidine (PEPCID) 40 MG tablet Take 40 mg by mouth every evening Yes Historical Provider, MD   omeprazole (PRILOSEC OTC) 20 MG tablet Take 1 tablet by mouth in the morning and at bedtime Yes BELEN Mason - CNP   albuterol sulfate  (90 Base) MCG/ACT inhaler Inhale 1 puff into the lungs every 4 hours as needed Yes Ar Automatic Reconciliation   methylfolate (DEPLIN) 7.5 MG TABS tablet Take 1 tablet by mouth in the morning.   Patient not taking: Reported on 10/11/2022  Leonid Kwok MD       [facility] (Including outside providers/suppliers regularly involved in providing care):   Patient Care Team:  Charlotte Martinez DO as PCP - Lindsay Bejarano 51., MD as Physician     Reviewed and updated this visit:  Tobacco  Allergies  Meds  Med Hx  Surg Hx  Soc Hx  Fam Hx        Reviewed her labs answered all her questions we will set her up for a DEXA recommended a mammogram but somebody told her she does not need 1 history of breast cancer in the past and apparently the surgeon or some other doctor told her she does not need anymore checkups? Patient will think about my opinion when I told her need to see patient back to go over her blood pressure discussed her cholesterol recommended. We will recheck a TSH T3 and T4 and call her back with the results and plan. I have spent a total of 8-15 minutes assessing, reviewing, and discussing the depression screening with patient in office today.

## 2024-05-20 RX ORDER — BUPROPION HYDROCHLORIDE 300 MG/1
300 TABLET ORAL EVERY MORNING
Qty: 30 TABLET | Refills: 3 | OUTPATIENT
Start: 2024-05-20

## 2024-09-24 ENCOUNTER — TELEPHONE (OUTPATIENT)
Dept: ONCOLOGY | Age: 79
End: 2024-09-24

## 2024-09-24 NOTE — TELEPHONE ENCOUNTER
Physician provider: Dr. Guzman  Reason for today's call (Please detail here patients chief complaint): Appointment  Last office visit:02/09/22    Patient would like follow up

## 2024-09-25 ENCOUNTER — TELEPHONE (OUTPATIENT)
Dept: ONCOLOGY | Age: 79
End: 2024-09-25

## 2024-09-25 DIAGNOSIS — Z85.43 HISTORY OF OVARIAN CANCER: Primary | ICD-10-CM

## 2024-09-25 DIAGNOSIS — C56.9 MALIGNANT NEOPLASM OF OVARY, UNSPECIFIED LATERALITY (HCC): Primary | ICD-10-CM

## 2024-10-29 ENCOUNTER — OFFICE VISIT (OUTPATIENT)
Dept: INTERNAL MEDICINE CLINIC | Facility: CLINIC | Age: 79
End: 2024-10-29

## 2024-10-29 VITALS
TEMPERATURE: 97.5 F | OXYGEN SATURATION: 97 % | HEIGHT: 62 IN | RESPIRATION RATE: 20 BRPM | SYSTOLIC BLOOD PRESSURE: 168 MMHG | WEIGHT: 167 LBS | HEART RATE: 85 BPM | BODY MASS INDEX: 30.73 KG/M2 | DIASTOLIC BLOOD PRESSURE: 80 MMHG

## 2024-10-29 DIAGNOSIS — G47.33 OSA (OBSTRUCTIVE SLEEP APNEA): ICD-10-CM

## 2024-10-29 DIAGNOSIS — K92.1 BLACK TARRY STOOLS: ICD-10-CM

## 2024-10-29 DIAGNOSIS — F33.0 MILD EPISODE OF RECURRENT MAJOR DEPRESSIVE DISORDER (HCC): ICD-10-CM

## 2024-10-29 DIAGNOSIS — K21.9 GASTROESOPHAGEAL REFLUX DISEASE, UNSPECIFIED WHETHER ESOPHAGITIS PRESENT: ICD-10-CM

## 2024-10-29 DIAGNOSIS — R03.0 ELEVATED BP WITHOUT DIAGNOSIS OF HYPERTENSION: ICD-10-CM

## 2024-10-29 DIAGNOSIS — M54.50 LUMBAR BACK PAIN: ICD-10-CM

## 2024-10-29 DIAGNOSIS — J45.909 UNCOMPLICATED ASTHMA, UNSPECIFIED ASTHMA SEVERITY, UNSPECIFIED WHETHER PERSISTENT: Primary | ICD-10-CM

## 2024-10-29 DIAGNOSIS — Z91.81 AT HIGH RISK FOR FALLS: ICD-10-CM

## 2024-10-29 LAB
ALBUMIN SERPL-MCNC: 4.1 G/DL (ref 3.2–4.6)
ALBUMIN/GLOB SERPL: 1.3 (ref 1–1.9)
ALP SERPL-CCNC: 70 U/L (ref 35–104)
ALT SERPL-CCNC: 11 U/L (ref 8–45)
ANION GAP SERPL CALC-SCNC: 10 MMOL/L (ref 7–16)
AST SERPL-CCNC: 22 U/L (ref 15–37)
BASOPHILS # BLD: 0.1 K/UL (ref 0–0.2)
BASOPHILS NFR BLD: 1 % (ref 0–2)
BILIRUB SERPL-MCNC: 0.4 MG/DL (ref 0–1.2)
BUN SERPL-MCNC: 12 MG/DL (ref 8–23)
CALCIUM SERPL-MCNC: 9.6 MG/DL (ref 8.8–10.2)
CHLORIDE SERPL-SCNC: 104 MMOL/L (ref 98–107)
CO2 SERPL-SCNC: 27 MMOL/L (ref 20–29)
CREAT SERPL-MCNC: 0.81 MG/DL (ref 0.6–1.1)
DIFFERENTIAL METHOD BLD: ABNORMAL
EOSINOPHIL # BLD: 1.2 K/UL (ref 0–0.8)
EOSINOPHIL NFR BLD: 18 % (ref 0.5–7.8)
ERYTHROCYTE [DISTWIDTH] IN BLOOD BY AUTOMATED COUNT: 13.9 % (ref 11.9–14.6)
GLOBULIN SER CALC-MCNC: 3.3 G/DL (ref 2.3–3.5)
GLUCOSE SERPL-MCNC: 95 MG/DL (ref 70–99)
HCT VFR BLD AUTO: 40.5 % (ref 35.8–46.3)
HGB BLD-MCNC: 13.1 G/DL (ref 11.7–15.4)
IMM GRANULOCYTES # BLD AUTO: 0 K/UL (ref 0–0.5)
IMM GRANULOCYTES NFR BLD AUTO: 0 % (ref 0–5)
IRON SATN MFR SERPL: 17 % (ref 20–50)
IRON SERPL-MCNC: 66 UG/DL (ref 35–100)
LYMPHOCYTES # BLD: 2.2 K/UL (ref 0.5–4.6)
LYMPHOCYTES NFR BLD: 35 % (ref 13–44)
MCH RBC QN AUTO: 28.1 PG (ref 26.1–32.9)
MCHC RBC AUTO-ENTMCNC: 32.3 G/DL (ref 31.4–35)
MCV RBC AUTO: 86.9 FL (ref 82–102)
MONOCYTES # BLD: 0.4 K/UL (ref 0.1–1.3)
MONOCYTES NFR BLD: 7 % (ref 4–12)
NEUTS SEG # BLD: 2.4 K/UL (ref 1.7–8.2)
NEUTS SEG NFR BLD: 39 % (ref 43–78)
NRBC # BLD: 0 K/UL (ref 0–0.2)
PLATELET # BLD AUTO: 277 K/UL (ref 150–450)
PMV BLD AUTO: 10.2 FL (ref 9.4–12.3)
POTASSIUM SERPL-SCNC: 4.1 MMOL/L (ref 3.5–5.1)
PROT SERPL-MCNC: 7.4 G/DL (ref 6.3–8.2)
RBC # BLD AUTO: 4.66 M/UL (ref 4.05–5.2)
SODIUM SERPL-SCNC: 142 MMOL/L (ref 136–145)
TIBC SERPL-MCNC: 397 UG/DL (ref 240–450)
UIBC SERPL-MCNC: 331 UG/DL (ref 112–347)
WBC # BLD AUTO: 6.3 K/UL (ref 4.3–11.1)

## 2024-10-29 RX ORDER — FLUTICASONE PROPIONATE AND SALMETEROL 500; 50 UG/1; UG/1
1 POWDER RESPIRATORY (INHALATION) 2 TIMES DAILY
COMMUNITY

## 2024-10-29 SDOH — ECONOMIC STABILITY: FOOD INSECURITY: WITHIN THE PAST 12 MONTHS, YOU WORRIED THAT YOUR FOOD WOULD RUN OUT BEFORE YOU GOT MONEY TO BUY MORE.: NEVER TRUE

## 2024-10-29 SDOH — ECONOMIC STABILITY: FOOD INSECURITY: WITHIN THE PAST 12 MONTHS, THE FOOD YOU BOUGHT JUST DIDN'T LAST AND YOU DIDN'T HAVE MONEY TO GET MORE.: NEVER TRUE

## 2024-10-29 SDOH — ECONOMIC STABILITY: INCOME INSECURITY: HOW HARD IS IT FOR YOU TO PAY FOR THE VERY BASICS LIKE FOOD, HOUSING, MEDICAL CARE, AND HEATING?: NOT HARD AT ALL

## 2024-10-29 ASSESSMENT — PATIENT HEALTH QUESTIONNAIRE - PHQ9
8. MOVING OR SPEAKING SO SLOWLY THAT OTHER PEOPLE COULD HAVE NOTICED. OR THE OPPOSITE, BEING SO FIGETY OR RESTLESS THAT YOU HAVE BEEN MOVING AROUND A LOT MORE THAN USUAL: NOT AT ALL
2. FEELING DOWN, DEPRESSED OR HOPELESS: MORE THAN HALF THE DAYS
SUM OF ALL RESPONSES TO PHQ QUESTIONS 1-9: 8
3. TROUBLE FALLING OR STAYING ASLEEP: SEVERAL DAYS
1. LITTLE INTEREST OR PLEASURE IN DOING THINGS: NOT AT ALL
6. FEELING BAD ABOUT YOURSELF - OR THAT YOU ARE A FAILURE OR HAVE LET YOURSELF OR YOUR FAMILY DOWN: NOT AT ALL
4. FEELING TIRED OR HAVING LITTLE ENERGY: NEARLY EVERY DAY
10. IF YOU CHECKED OFF ANY PROBLEMS, HOW DIFFICULT HAVE THESE PROBLEMS MADE IT FOR YOU TO DO YOUR WORK, TAKE CARE OF THINGS AT HOME, OR GET ALONG WITH OTHER PEOPLE: SOMEWHAT DIFFICULT
5. POOR APPETITE OR OVEREATING: NOT AT ALL
7. TROUBLE CONCENTRATING ON THINGS, SUCH AS READING THE NEWSPAPER OR WATCHING TELEVISION: MORE THAN HALF THE DAYS
SUM OF ALL RESPONSES TO PHQ9 QUESTIONS 1 & 2: 2
SUM OF ALL RESPONSES TO PHQ QUESTIONS 1-9: 8
9. THOUGHTS THAT YOU WOULD BE BETTER OFF DEAD, OR OF HURTING YOURSELF: NOT AT ALL

## 2024-10-29 NOTE — PROGRESS NOTES
[facility]  [address] [place] [id number]  Phone: [phone]  Fax: [phone]      1. Uncomplicated asthma, unspecified asthma severity, unspecified whether persistent  Overview:  -She follows with pulmonary.    -She has been on Wixela, spiriva, albuterol, singulair.  Assessment & Plan:   Chronic, at goal (stable), continue current treatment plan  2. Gastroesophageal reflux disease, unspecified whether esophagitis present  Overview:  On omeprazole.  Assessment & Plan:   Chronic, at goal (stable), continue current treatment plan  3. At high risk for falls  Overview:  She fell twice at home in 3/2024, no falls since.  Assessment & Plan:   She declines PT, states she is doing much better now in this regard.  4. Lumbar back pain  5. ANA (obstructive sleep apnea)  Overview:  Non-compliant with CPAP      Assessment & Plan:   Chronic, not at goal (unstable),   6. Black tarry stools  Overview:  Started 2 months ago.  Intermittent. Last episode was one ago. She never had this problem before. Last upper and lower endoscopy 2006.  Assessment & Plan:   Will need GI eval.  Orders:  -     Ambulatory referral to Gastroenterology  -     Comprehensive Metabolic Panel; Future  -     CBC with Auto Differential; Future  -     Iron and TIBC; Future  7. Elevated BP without diagnosis of hypertension  Overview:  She does have a cuff at home, but doesn't use it often.   Assessment & Plan:   New, uncertain prognosis, continue current treatment plan  - Will plan for checking BP at home and bring back the journal and cuff at next visit.  8. Mild episode of recurrent major depressive disorder (HCC)  Overview:  She follows with psychiatry  Assessment & Plan:   Chronic, at goal (stable), continue current treatment plan  - she reports feeling better, declines meds        Current Outpatient Medications   Medication Instructions    albuterol sulfate  (90 Base) MCG/ACT inhaler 1 puff, Inhalation,

## 2024-10-29 NOTE — ASSESSMENT & PLAN NOTE
New, uncertain prognosis, continue current treatment plan  - Will plan for checking BP at home and bring back the journal and cuff at next visit.

## 2024-10-29 NOTE — ASSESSMENT & PLAN NOTE
Chronic, at goal (stable), continue current treatment plan  - she reports feeling better, declines meds

## 2024-11-01 ENCOUNTER — TELEPHONE (OUTPATIENT)
Dept: INTERNAL MEDICINE CLINIC | Facility: CLINIC | Age: 79
End: 2024-11-01

## 2024-11-01 NOTE — TELEPHONE ENCOUNTER
Called patient to discuss lab results from Dr. Solis. Upon patient answering, I introduced my self as Ila, Dr. ALEXANDER's nurse calling from [facility]. Patient then states that she did not make her appointment today because she was in a lot of pain, states that me nor the \"foreigner I talked to early\" understand her pain and when her lower back pain starts it has to be taken care of at that moment.     Patient informed that no one in our  is foreign and it was probably our [facility] team call center who she spoke with. Patient kept repeating \"the foreigner that I talked to kept telling me I didn't have an appointment and my next appointment was in December or January.\" I checked patients chart and her next scheduled appointment was for 1/29/25. Also kept reminding patient that we cannot address an issue, if we don't know about it. Patient stated that Dr ALEXANDER and his nurse were very pleasant on her visit on 10/29/24, but the foreigners in our office and her not getting an appointment when she wanted one resulting in her going to the ER to be seen, is enough reason for her to find a new pcp. Informed patient again, that I was Dr. ALEXANDER's nurse, which she then started yelling on the phone \"thank you God, Thank goodness\" multiple times. Patient was told that Dr ALEXANDER cannot address her pain with out seeing her but if the pain got too bad, she should be seen at the ER for evaluation. Patient states we don't care nor understand her pain, I again informed her that on her visit she did not mention any pain to me nor Dr. ALEXANDER and she states she did not mention it because Dr. ALEXANDER would dismiss her as a patient and tell her \"to get out the door\". Patient was made aware that we cannot address anything and to not make assumptions on care if we don't not have record of it. Patient kept saying that the foreigners make it hard for her and its not fair. Informed patient that I will make her an appointment with Dr. ALEXANDER

## 2024-11-14 ENCOUNTER — OFFICE VISIT (OUTPATIENT)
Dept: INTERNAL MEDICINE CLINIC | Facility: CLINIC | Age: 79
End: 2024-11-14

## 2024-11-14 VITALS
RESPIRATION RATE: 17 BRPM | SYSTOLIC BLOOD PRESSURE: 130 MMHG | DIASTOLIC BLOOD PRESSURE: 70 MMHG | HEART RATE: 104 BPM | OXYGEN SATURATION: 98 % | WEIGHT: 171.4 LBS | HEIGHT: 62 IN | BODY MASS INDEX: 31.54 KG/M2

## 2024-11-14 DIAGNOSIS — R10.9 FLANK PAIN: ICD-10-CM

## 2024-11-14 DIAGNOSIS — K21.9 GASTROESOPHAGEAL REFLUX DISEASE, UNSPECIFIED WHETHER ESOPHAGITIS PRESENT: ICD-10-CM

## 2024-11-14 DIAGNOSIS — M54.50 LUMBAR BACK PAIN: Primary | ICD-10-CM

## 2024-11-14 NOTE — ASSESSMENT & PLAN NOTE
Chronic, worsening (exacerbation), continue current treatment plan  - She has been taking tylenol.  - She declines PT.   - She is ok to continue taking Advil and tylenol.

## 2024-11-14 NOTE — PROGRESS NOTES
[facility]  [address] [place] [address]  Phone: [phone]  Fax: [phone]      Problem Based Overview with Integrated Assessment and Plan    Genevieve Long is a 79 y.o. year old female who presents with low back pain with radiation down the right leg that she has had intermittently.    1. Lumbar back pain  Overview:  -She reports chronic intermittent low back pain. She has been having for greater than 2 years.  She had undergone MRI in 2022, but never had follow up after the study. She declined going to \"covid\".    - She reports having had a fall in 2023 as well.      MRI Result (most recent):  MRI LUMBAR SPINE WO CONTRAST 04/01/2022    Narrative  Exam: MRI lumbar spine without contrast.  Indication: Low back pain  Comparison: Lumbar spine radiographs, 3/17/2022  Contrast: None  Technique: Multiplanar multisequence imaging of the lumbar spine was performed  without contrast.    FINDINGS:  There are 5 nonrib-bearing lumbar-type vertebral bodies. Minimal sigmoid  curvature of the lumbar spine. Grade 1 anterolisthesis of L4 on L5 is unchanged.  Vertebral body heights are preserved. No fracture or aggressive marrow signal  abnormality. The conus medullaris terminates at T12. There is compression of the  cauda equina nerve roots at L2-L3. There is clumping of the cauda equina nerve  roots in the lower lumbar spine beginning at approximately L2-L3 and extending  inferiorly. Expected postsurgical changes in the dorsal soft tissues.  Prevertebral soft tissues are within normal limits.    Postsurgical changes of L4-L5 posterior decompression and instrumentation  effusion with translumbar interbody fusion. Susceptibility artifact from the  metallic hardware limits evaluation of adjacent structures. Multilevel disc  desiccation with varying degrees of disc space narrowing. Type I Modic changes  at L2-L3.    T11-T12: No spinal canal or neuroforaminal

## 2024-11-14 NOTE — ASSESSMENT & PLAN NOTE
She states she has been having more heart burn sensation lately, she has a referral to GI coming up.

## 2024-11-21 ENCOUNTER — HOSPITAL ENCOUNTER (OUTPATIENT)
Dept: GENERAL RADIOLOGY | Age: 79
Discharge: HOME | End: 2024-11-21
Payer: COMMERCIAL

## 2024-11-21 DIAGNOSIS — M54.50 LUMBAR BACK PAIN: ICD-10-CM

## 2024-11-21 PROCEDURE — 72072 X-RAY EXAM THORAC SPINE 3VWS: CPT

## 2024-11-21 PROCEDURE — 72100 X-RAY EXAM L-S SPINE 2/3 VWS: CPT

## 2024-11-22 ENCOUNTER — TELEPHONE (OUTPATIENT)
Dept: INTERNAL MEDICINE CLINIC | Facility: CLINIC | Age: 79
End: 2024-11-22

## 2024-11-22 NOTE — TELEPHONE ENCOUNTER
----- Message from Dr. Lalito Solis MD sent at 11/22/2024 11:07 AM EST -----  Please call patient and inform that her xrays showed diffuse bilateral lumbar facet DJD along with  bilateral SI joint DJD, ie arthritis.    - She may benefit from an evaluation by a back specialist. Referral placed.

## 2024-12-06 ENCOUNTER — OFFICE VISIT (OUTPATIENT)
Dept: INTERNAL MEDICINE CLINIC | Facility: CLINIC | Age: 79
End: 2024-12-06

## 2024-12-06 VITALS
DIASTOLIC BLOOD PRESSURE: 78 MMHG | HEART RATE: 83 BPM | HEIGHT: 62 IN | BODY MASS INDEX: 30.91 KG/M2 | TEMPERATURE: 98.1 F | SYSTOLIC BLOOD PRESSURE: 138 MMHG | WEIGHT: 168 LBS

## 2024-12-06 DIAGNOSIS — H91.92 HEARING LOSS OF LEFT EAR, UNSPECIFIED HEARING LOSS TYPE: ICD-10-CM

## 2024-12-06 DIAGNOSIS — K44.9 HIATAL HERNIA: ICD-10-CM

## 2024-12-06 DIAGNOSIS — K21.9 GASTROESOPHAGEAL REFLUX DISEASE, UNSPECIFIED WHETHER ESOPHAGITIS PRESENT: Primary | ICD-10-CM

## 2024-12-06 DIAGNOSIS — M54.41 CHRONIC BILATERAL LOW BACK PAIN WITH RIGHT-SIDED SCIATICA: ICD-10-CM

## 2024-12-06 DIAGNOSIS — G89.29 CHRONIC BILATERAL LOW BACK PAIN WITH RIGHT-SIDED SCIATICA: ICD-10-CM

## 2024-12-06 DIAGNOSIS — F33.0 MAJOR DEPRESSIVE DISORDER, RECURRENT, MILD (HCC): ICD-10-CM

## 2024-12-06 NOTE — PROGRESS NOTES
[facility] [place] [address]  Phone: [phone]  Fax: [phone]      Problem Based Overview with Integrated Assessment and Plan    Genevieve Long is a 79 y.o. year old female who presents for back pain.      We went over her reuslts from the recent xrays and the MRI from 2022.    She has an appointment with POA on 1/3/25.        1. Gastroesophageal reflux disease, unspecified whether esophagitis present  Overview:  On omeprazole 20 mg BID.    She states that it started worsening this past month.    She has appointment to see GI on 1/16/24.  2. Hiatal hernia  Overview:  May be contributing to her cough/GERD sx.  3. Chronic bilateral low back pain with right-sided sciatica  Overview:  -She reports chronic intermittent low back pain. She has been having for greater than 2 years.  She had undergone MRI in 2022, but never had follow up after the study. She declined going to \"covid\".    - She reports having had a fall in 2023 as well.          Assessment & Plan:   Chronic, worsening (exacerbation), continue current treatment plan    Referral to ortho pending  4. Hearing loss of left ear, unspecified hearing loss type  Overview:  Gradually worsening, never had hearing test  Orders:  -     [facility] ([facility]iology), [place]  5. Major depressive disorder, recurrent, mild (HCC)  Overview:  Persists, declines treatment.  Assessment & Plan:   Chronic, not at goal (unstable), continue current treatment plan         Current Outpatient Medications   Medication Instructions    albuterol sulfate  (90 Base) MCG/ACT inhaler 1 puff, Inhalation, EVERY 4 HOURS PRN    fluticasone-salmeterol (WIXELA INHUB) 500-50 MCG/ACT AEPB diskus inhaler 1 puff, Inhalation, 2 TIMES DAILY    montelukast (SINGULAIR) 10 MG tablet TAKE 1 TABLET BY MOUTH NIGHTLY.    omeprazole (PRILOSEC) 20 mg, Oral, 2 times daily    SPIRIVA RESPIMAT 2.5 MCG/ACT AERS inhaler TAKE 2 PUFFS

## 2024-12-17 ENCOUNTER — PREP FOR PROCEDURE (OUTPATIENT)
Age: 79
End: 2024-12-17

## 2024-12-17 ENCOUNTER — OFFICE VISIT (OUTPATIENT)
Age: 79
End: 2024-12-17
Payer: MEDICARE

## 2024-12-17 ENCOUNTER — TELEPHONE (OUTPATIENT)
Age: 79
End: 2024-12-17

## 2024-12-17 VITALS
BODY MASS INDEX: 31.06 KG/M2 | RESPIRATION RATE: 18 BRPM | SYSTOLIC BLOOD PRESSURE: 134 MMHG | OXYGEN SATURATION: 97 % | DIASTOLIC BLOOD PRESSURE: 64 MMHG | WEIGHT: 169.8 LBS | HEART RATE: 84 BPM

## 2024-12-17 DIAGNOSIS — Z12.11 ENCOUNTER FOR SCREENING COLONOSCOPY: Primary | ICD-10-CM

## 2024-12-17 DIAGNOSIS — K52.9 CHRONIC DIARRHEA: ICD-10-CM

## 2024-12-17 DIAGNOSIS — K21.9 GASTROESOPHAGEAL REFLUX DISEASE, UNSPECIFIED WHETHER ESOPHAGITIS PRESENT: Primary | ICD-10-CM

## 2024-12-17 PROCEDURE — 1036F TOBACCO NON-USER: CPT | Performed by: PHYSICIAN ASSISTANT

## 2024-12-17 PROCEDURE — G8399 PT W/DXA RESULTS DOCUMENT: HCPCS | Performed by: PHYSICIAN ASSISTANT

## 2024-12-17 PROCEDURE — 1160F RVW MEDS BY RX/DR IN RCRD: CPT | Performed by: PHYSICIAN ASSISTANT

## 2024-12-17 PROCEDURE — G8417 CALC BMI ABV UP PARAM F/U: HCPCS | Performed by: PHYSICIAN ASSISTANT

## 2024-12-17 PROCEDURE — 1090F PRES/ABSN URINE INCON ASSESS: CPT | Performed by: PHYSICIAN ASSISTANT

## 2024-12-17 PROCEDURE — 99204 OFFICE O/P NEW MOD 45 MIN: CPT | Performed by: PHYSICIAN ASSISTANT

## 2024-12-17 PROCEDURE — 1159F MED LIST DOCD IN RCRD: CPT | Performed by: PHYSICIAN ASSISTANT

## 2024-12-17 PROCEDURE — G8482 FLU IMMUNIZE ORDER/ADMIN: HCPCS | Performed by: PHYSICIAN ASSISTANT

## 2024-12-17 PROCEDURE — G8427 DOCREV CUR MEDS BY ELIG CLIN: HCPCS | Performed by: PHYSICIAN ASSISTANT

## 2024-12-17 PROCEDURE — 1123F ACP DISCUSS/DSCN MKR DOCD: CPT | Performed by: PHYSICIAN ASSISTANT

## 2024-12-17 RX ORDER — SODIUM CHLORIDE 9 MG/ML
25 INJECTION, SOLUTION INTRAVENOUS PRN
Status: CANCELLED | OUTPATIENT
Start: 2024-12-17

## 2024-12-17 RX ORDER — SODIUM, POTASSIUM,MAG SULFATES 17.5-3.13G
1 SOLUTION, RECONSTITUTED, ORAL ORAL SEE ADMIN INSTRUCTIONS
Qty: 1 EACH | Refills: 0 | Status: SHIPPED | OUTPATIENT
Start: 2024-12-17

## 2024-12-17 RX ORDER — SODIUM CHLORIDE 0.9 % (FLUSH) 0.9 %
5-40 SYRINGE (ML) INJECTION EVERY 12 HOURS SCHEDULED
Status: CANCELLED | OUTPATIENT
Start: 2024-12-17

## 2024-12-17 RX ORDER — OMEPRAZOLE 40 MG/1
40 CAPSULE, DELAYED RELEASE ORAL
Qty: 180 CAPSULE | Refills: 0 | Status: SHIPPED | OUTPATIENT
Start: 2024-12-17

## 2024-12-17 RX ORDER — SODIUM CHLORIDE 0.9 % (FLUSH) 0.9 %
5-40 SYRINGE (ML) INJECTION PRN
Status: CANCELLED | OUTPATIENT
Start: 2024-12-17

## 2024-12-17 RX ORDER — FAMOTIDINE 40 MG/1
40 TABLET, FILM COATED ORAL EVERY EVENING
Qty: 90 TABLET | Refills: 0 | Status: SHIPPED | OUTPATIENT
Start: 2024-12-17

## 2024-12-17 NOTE — PROGRESS NOTES
Genevieve Long (:  1945) is a 79 y.o. female new patient referred to our office for evaluation of the following chief complaint(s):  New Patient, Diarrhea, and Gastroesophageal Reflux (Uncontrolled reflux sx on prilosec; Pt wants to stop taking prilosec, has been taking for 28 years; wants alternate option and/or evaluation)        Assessment & Plan   ASSESSMENT/PLAN:  1. Gastroesophageal reflux disease, unspecified whether esophagitis present  -     omeprazole (PRILOSEC) 40 MG delayed release capsule; Take 1 capsule by mouth 2 times daily (before meals), Disp-180 capsule, R-0Normal  -     famotidine (PEPCID) 40 MG tablet; Take 1 tablet by mouth every evening, Disp-90 tablet, R-0Normal  2. Chronic diarrhea      -Counseled patient and provided written recommendations for diet and lifestyle modifications for GERD. Avoid tobacco, alcohol, NSAIDs. Increase Prilosec 20 mg BID AC to 40 mg BID AC. Add Pepcid 40 mg QHS.   -Schedule EGD and colonoscopy.       Subjective   SUBJECTIVE/OBJECTIVE  Genevieve Long is a 79 y.o. year old female referred to our office for evaluation of melena. Referral note reviewed from 10/29/2024. Same-day labs show Hgb WNL, iron 66 with slightly decreased iron saturation 17%.  No prior GI or endoscopy records discovered on chart review.    Today patient reports a hx of chronic GERD which remains uncontrolled despite Prilosec 20 mg OTC BID. Previously took Prilosec 20 mg daily x 28 years but increased due to uncontrolled symptoms. She reports she was having black stool in October but admits she was taking pepto-bismol \"like water.\" When she discontinued use her stool color normalized. She reports \"horrible\" heartburn with all PO intake as well as nocturnal esophageal reflux. Describes significant epigastric and esophageal burning. She has known hiatal hernia. She reports frequent nausea without vomiting. She describes a few episodes of solid food dysphagia. Has been having

## 2024-12-17 NOTE — TELEPHONE ENCOUNTER
Staff message 12/17/24:  \"Josh Kwon Amanda, RN  Please send Suprep to the pharmacy on file. Thanks\"    -----------    As per standing order from Dr. Lopez, Suprep sent to pharmacy on file.

## 2024-12-17 NOTE — TELEPHONE ENCOUNTER
Patient in office, scheduled for colonoscopy and EGD with Dr. Lopez Thursday 1/2/2025 at [facility]. Suprep prep instructions given to the patient in office. Sent a staff message to the nurse to send Suprep to the pharmacy on file.      Items to purchase 5 days before your procedure:    Suprep -  prescription at your pharmacy.  Purchase one 10oz bottle of Magnesium Citrate  Purchase Dulcolax Laxative tablets (Not stool softener tablets).      Two days before your procedure:  Tuesday 12/31/2024    Drink at least eight glasses of water today.  Stop eating high- fiber foods such as vegetables and beans until after your colonoscopy. You can eat all other types of food today.     Drink the 10 oz bottle of magnesium citrate after dinner.      The day before your Colonoscopy: Wednesday 1/1/2025    Clear liquids only the entire day (water, Gatorade, coffee, tea, Sprite, 7-up, Jell-O, popsicles, chicken / beef broth, apple juice).    No milk / No dairy products, NO RED, BLUE or PURPLE color liquids /dyes    4:00 pm Take 2 Dulcolax tablets.     6:00 pm - Pour one 6 oz bottle of Suprep liquid into the mixing container. Add cold water to the 16-oz line and mix. Drink all the solution over 10-15 minutes.   Drink two more 16-ounce glasses of water over the next hour.    The day of your procedure: Thursday 1/2/2025    6 hours prior to arrival time of your procedure, pour one 6 oz bottle of Suprep liquid into the mixing container. Add cold water to the 16-oz line and mix. Drink all the solution over 10-15 minutes. Drink two more 16-ounce glasses of water. Must finish drinking the final glass of water at least 4 hours prior to arrival time.    NOTHING TO EAT UNTIL AFTER YOUR PROCEDURE.    IMPORTANT:      If you do not follow these instructions, your colonoscopy could be canceled due to having an unclean prep.

## 2024-12-17 NOTE — PATIENT INSTRUCTIONS
For diarrhea:   Take Imodium 1-2 tablets as needed for diarrhea. If you are frequently having urgent diarrhea following meals, schedule Imodium dose 30 minutes prior to meals. Be sure to stay well hydrated. Avoid alcohol as well as food and beverages high in fat, sugar, artificial sweeteners or stimulants such as nicotine or caffeine. Increase daily fiber intake to 25-35 grams daily either by dietary intake or an over-the-counter psyllium husk fiber supplement. Keep a diet journal to evaluate for any food triggers.    For reflux:   Eat smaller and more frequent meals. Avoid lying down for 3 hours after eating. Elevate the head of the bed by 6 inches. Avoid wearing tight-fitting clothing. Stop smoking and avoid alcohol. Avoid NSAID medications (Aspirin, Excedrin, Aleve, Ibuprofen, Goody Powder, Toradol, Mobic, Voltaren, etc). Consider weight loss to get to a healthy weight, which is often critical to eliminating symptoms of reflux. Avoid foods and acid-containing beverages that can exacerbate the symptoms of reflux. This includes:     -Caffeine  -Chocolate  -Peppermint  -Alcohol (especially red wine)  -Carbonated beverages  -Citrus fruits  -Tomato-based products  -Vinegar  -Spicy and greasy foods

## 2024-12-23 ENCOUNTER — TELEPHONE (OUTPATIENT)
Age: 79
End: 2024-12-23

## 2024-12-23 NOTE — TELEPHONE ENCOUNTER
The patient is scheduled for EGD and colonoscopy on 1/2/2025. Per case message from pre-assessment nurse Received voicemail from pt stating that she doesn't think she can have surgery. Pt states her asthma is bothering her so bad that she is going to the ER. Called and spoke with the patient and rescheduled 1/22/2025.

## 2025-01-21 ENCOUNTER — TELEPHONE (OUTPATIENT)
Age: 80
End: 2025-01-21

## 2025-01-21 NOTE — TELEPHONE ENCOUNTER
Pt lvm; asked to cancel GI procedure tomorrow.  She will call back when she is ready to reschedule.

## 2025-01-29 ENCOUNTER — OFFICE VISIT (OUTPATIENT)
Dept: INTERNAL MEDICINE CLINIC | Facility: CLINIC | Age: 80
End: 2025-01-29
Payer: MEDICARE

## 2025-01-29 VITALS
WEIGHT: 171 LBS | BODY MASS INDEX: 31.28 KG/M2 | DIASTOLIC BLOOD PRESSURE: 58 MMHG | SYSTOLIC BLOOD PRESSURE: 126 MMHG | OXYGEN SATURATION: 94 % | TEMPERATURE: 98 F | HEART RATE: 105 BPM

## 2025-01-29 DIAGNOSIS — F33.2 SEVERE EPISODE OF RECURRENT MAJOR DEPRESSIVE DISORDER, WITHOUT PSYCHOTIC FEATURES (HCC): ICD-10-CM

## 2025-01-29 DIAGNOSIS — N30.00 ACUTE CYSTITIS WITHOUT HEMATURIA: Primary | ICD-10-CM

## 2025-01-29 DIAGNOSIS — R30.9 PAINFUL URINATION: ICD-10-CM

## 2025-01-29 DIAGNOSIS — Z91.09 ENVIRONMENTAL ALLERGIES: ICD-10-CM

## 2025-01-29 DIAGNOSIS — J45.20 MILD INTERMITTENT ASTHMA, UNSPECIFIED WHETHER COMPLICATED: ICD-10-CM

## 2025-01-29 PROBLEM — C56.3 MALIGNANT NEOPLASM OF BOTH OVARIES (HCC): Status: RESOLVED | Noted: 2019-03-27 | Resolved: 2025-01-29

## 2025-01-29 LAB
BILIRUBIN, URINE, POC: ABNORMAL
BLOOD URINE, POC: NEGATIVE
GLUCOSE URINE, POC: 100
KETONES, URINE, POC: ABNORMAL
LEUKOCYTE ESTERASE, URINE, POC: ABNORMAL
NITRITE, URINE, POC: POSITIVE
PH, URINE, POC: 6.5 (ref 4.6–8)
PROTEIN,URINE, POC: 300
SPECIFIC GRAVITY, URINE, POC: 1.01 (ref 1–1.03)
URINALYSIS CLARITY, POC: ABNORMAL
URINALYSIS COLOR, POC: ABNORMAL
UROBILINOGEN, POC: ABNORMAL MG/DL

## 2025-01-29 PROCEDURE — G8399 PT W/DXA RESULTS DOCUMENT: HCPCS | Performed by: INTERNAL MEDICINE

## 2025-01-29 PROCEDURE — 1036F TOBACCO NON-USER: CPT | Performed by: INTERNAL MEDICINE

## 2025-01-29 PROCEDURE — 1159F MED LIST DOCD IN RCRD: CPT | Performed by: INTERNAL MEDICINE

## 2025-01-29 PROCEDURE — 81002 URINALYSIS NONAUTO W/O SCOPE: CPT | Performed by: INTERNAL MEDICINE

## 2025-01-29 PROCEDURE — G2211 COMPLEX E/M VISIT ADD ON: HCPCS | Performed by: INTERNAL MEDICINE

## 2025-01-29 PROCEDURE — 1090F PRES/ABSN URINE INCON ASSESS: CPT | Performed by: INTERNAL MEDICINE

## 2025-01-29 PROCEDURE — 1123F ACP DISCUSS/DSCN MKR DOCD: CPT | Performed by: INTERNAL MEDICINE

## 2025-01-29 PROCEDURE — 99214 OFFICE O/P EST MOD 30 MIN: CPT | Performed by: INTERNAL MEDICINE

## 2025-01-29 PROCEDURE — G8427 DOCREV CUR MEDS BY ELIG CLIN: HCPCS | Performed by: INTERNAL MEDICINE

## 2025-01-29 PROCEDURE — G8417 CALC BMI ABV UP PARAM F/U: HCPCS | Performed by: INTERNAL MEDICINE

## 2025-01-29 RX ORDER — FLUTICASONE PROPIONATE AND SALMETEROL 500; 50 UG/1; UG/1
1 POWDER RESPIRATORY (INHALATION) 2 TIMES DAILY
Qty: 60 EACH | Refills: 5 | Status: SHIPPED | OUTPATIENT
Start: 2025-01-29

## 2025-01-29 RX ORDER — ALBUTEROL SULFATE 90 UG/1
1 INHALANT RESPIRATORY (INHALATION) EVERY 4 HOURS PRN
Qty: 18 G | Refills: 5 | Status: SHIPPED | OUTPATIENT
Start: 2025-01-29

## 2025-01-29 RX ORDER — MONTELUKAST SODIUM 10 MG/1
TABLET ORAL
Qty: 90 TABLET | Refills: 3 | Status: SHIPPED | OUTPATIENT
Start: 2025-01-29

## 2025-01-29 RX ORDER — SACCHAROMYCES BOULARDII 250 MG
250 CAPSULE ORAL 2 TIMES DAILY
Qty: 14 CAPSULE | Refills: 0 | Status: SHIPPED | OUTPATIENT
Start: 2025-01-29 | End: 2025-01-31 | Stop reason: SDUPTHER

## 2025-01-29 ASSESSMENT — PATIENT HEALTH QUESTIONNAIRE - PHQ9
1. LITTLE INTEREST OR PLEASURE IN DOING THINGS: NOT AT ALL
4. FEELING TIRED OR HAVING LITTLE ENERGY: NOT AT ALL
SUM OF ALL RESPONSES TO PHQ QUESTIONS 1-9: 0
8. MOVING OR SPEAKING SO SLOWLY THAT OTHER PEOPLE COULD HAVE NOTICED. OR THE OPPOSITE, BEING SO FIGETY OR RESTLESS THAT YOU HAVE BEEN MOVING AROUND A LOT MORE THAN USUAL: NOT AT ALL
SUM OF ALL RESPONSES TO PHQ QUESTIONS 1-9: 0
SUM OF ALL RESPONSES TO PHQ QUESTIONS 1-9: 0
10. IF YOU CHECKED OFF ANY PROBLEMS, HOW DIFFICULT HAVE THESE PROBLEMS MADE IT FOR YOU TO DO YOUR WORK, TAKE CARE OF THINGS AT HOME, OR GET ALONG WITH OTHER PEOPLE: NOT DIFFICULT AT ALL
6. FEELING BAD ABOUT YOURSELF - OR THAT YOU ARE A FAILURE OR HAVE LET YOURSELF OR YOUR FAMILY DOWN: NOT AT ALL
7. TROUBLE CONCENTRATING ON THINGS, SUCH AS READING THE NEWSPAPER OR WATCHING TELEVISION: NOT AT ALL
2. FEELING DOWN, DEPRESSED OR HOPELESS: NOT AT ALL
3. TROUBLE FALLING OR STAYING ASLEEP: NOT AT ALL
SUM OF ALL RESPONSES TO PHQ9 QUESTIONS 1 & 2: 0
SUM OF ALL RESPONSES TO PHQ QUESTIONS 1-9: 0
9. THOUGHTS THAT YOU WOULD BE BETTER OFF DEAD, OR OF HURTING YOURSELF: NOT AT ALL
5. POOR APPETITE OR OVEREATING: NOT AT ALL

## 2025-01-29 NOTE — PROGRESS NOTES
[facility]  [address] [place] [id number]  Phone: [phone]  Fax: [phone]      Problem Based Overview with Integrated Assessment and Plan    Genevieve Long is a 79 y.o. year old female who presents with cc UTI.    She states she has been having dysuria. She has been Azo without much benefit.She has been with symptoms of a urinary tract infection (UTI) that have persisted for 32 days. She reports severe dysuria, urgency, and inability to urinate. The pain is described as intense cramping, comparable to menstrual cramps \"times a thousand.\" The patient states she cannot sit or stand comfortably due to the pain. She has been self-treating with over-the-counter medication, which provides temporary pain relief for about 8 hours but does not resolve the infection. The patient notes that her UTIs usually resolve within a few days, but this episode has been unusually prolonged and severe. She reports associated symptoms of black stools and orange-colored urine, which she attributes to the over-the-counter medication.    The patient also mentions ongoing asthma management as a chronic condition. She uses Singulair to prevent congestion and carries an inhaler for acute asthma attacks. She requires refills for her asthma medications, including Wixela.    The patient reports symptoms of depression, which she attributes to her age of 79 years. She describes feeling angry at herself for decreased energy and inability to perform household tasks. She has previously tried bupropion and Zoloft for depression but discontinued due to side effects including falls and impaired driving.     The patient also mentions experiencing leg cramps during sleep, which she believes are due to magnesium deficiency and has been self-treating with magnesium and vitamin C supplements.    Medical History  - Asthma (lifelong)  - Urinary tract infection (current, ongoing for 32 days)  - Ovarian

## 2025-01-31 DIAGNOSIS — N30.00 ACUTE CYSTITIS WITHOUT HEMATURIA: ICD-10-CM

## 2025-01-31 RX ORDER — SACCHAROMYCES BOULARDII 250 MG
250 CAPSULE ORAL 2 TIMES DAILY
Qty: 14 CAPSULE | Refills: 0 | Status: SHIPPED | OUTPATIENT
Start: 2025-01-31 | End: 2025-02-07

## 2025-01-31 NOTE — TELEPHONE ENCOUNTER
Patient called the office back, requesting the Florastor 250mg capsules be sent in to the pharmacy.    Patient does not want to find it OTC.     Can you please resend the script to the pharmacy?

## 2025-01-31 NOTE — TELEPHONE ENCOUNTER
Pt was only able to  4 out of the 5 recently prescribed prescriptions    Saccharomyces was unable to be picked up.  Pharmacist told pt that they did not receive this prescription at all.    Pt requesting call back from the nurse

## 2025-02-10 ENCOUNTER — TELEPHONE (OUTPATIENT)
Dept: GASTROENTEROLOGY | Age: 80
End: 2025-02-10

## 2025-02-11 ENCOUNTER — PREP FOR PROCEDURE (OUTPATIENT)
Dept: GASTROENTEROLOGY | Age: 80
End: 2025-02-11

## 2025-02-11 RX ORDER — SODIUM CHLORIDE 0.9 % (FLUSH) 0.9 %
5-40 SYRINGE (ML) INJECTION PRN
Status: CANCELLED | OUTPATIENT
Start: 2025-02-11

## 2025-02-11 RX ORDER — SODIUM CHLORIDE 9 MG/ML
25 INJECTION, SOLUTION INTRAVENOUS PRN
Status: CANCELLED | OUTPATIENT
Start: 2025-02-11

## 2025-02-11 RX ORDER — SODIUM CHLORIDE 0.9 % (FLUSH) 0.9 %
5-40 SYRINGE (ML) INJECTION EVERY 12 HOURS SCHEDULED
Status: CANCELLED | OUTPATIENT
Start: 2025-02-11

## 2025-02-24 NOTE — PROGRESS NOTES
Patient Name:  Genevieve Long                               YOB: 1945  MRN: [id number]                                                Office Visit 2/25/2025    ASSESSMENT AND PLAN:  (Medical Decision Making)      1. Mild intermittent asthma, unspecified whether complicated  Not clearly exacerbated but she has significant symptoms of cough and shortness of breath despite high-dose LABA/ICS as well as Spiriva.  Her peripheral eosinophilia is very notable and I have strongly recommended that she consider Dupixent.  She watched a video and decided not to start it yet.    2. Chronic cough  Likely due to combined effects of asthma and reflux.    3. Environmental allergies  Notable.    4. Peripheral eosinophilia  She is very concerned about parasites and plans to order a parasite panel privately.  I do not think she is particularly high risk for parasitic infection and tried to reassure her about this, but she remains pretty unconvinced.      Yulia Louie MD    Total time for encounter on day of encounter was 40 minutes.  This time includes chart prep, review of tests/procedures, review of other provider's notes, documentation and counseling patient regarding disease process and medications.    ___________________________________________________________________         ______      The patient (or guardian, if applicable) and other individuals in attendance with the patient were advised that Artificial Intelligence will be utilized during this visit to record, process the conversation to generate a clinical note, and support improvement of the AI technology.     REASON FOR VISIT:   Chief Complaint   Patient presents with    Follow-up    Cough       History of Present Illness  The patient is a 79-year-old female with a history of right-sided breast carcinoma status post mastectomy and lymphadenectomy, bilateral carpal tunnel syndrome, obstructive sleep apnea (ANA) not managed with CPAP, and

## 2025-02-25 ENCOUNTER — NURSE ONLY (OUTPATIENT)
Dept: PULMONOLOGY | Age: 80
End: 2025-02-25

## 2025-02-25 ENCOUNTER — OFFICE VISIT (OUTPATIENT)
Dept: PULMONOLOGY | Age: 80
End: 2025-02-25
Payer: MEDICARE

## 2025-02-25 VITALS
HEART RATE: 89 BPM | RESPIRATION RATE: 16 BRPM | DIASTOLIC BLOOD PRESSURE: 88 MMHG | SYSTOLIC BLOOD PRESSURE: 153 MMHG | BODY MASS INDEX: 31.91 KG/M2 | WEIGHT: 169 LBS | OXYGEN SATURATION: 93 % | HEIGHT: 61 IN | TEMPERATURE: 96.8 F

## 2025-02-25 DIAGNOSIS — Z91.09 ENVIRONMENTAL ALLERGIES: ICD-10-CM

## 2025-02-25 DIAGNOSIS — R05.3 CHRONIC COUGH: Primary | ICD-10-CM

## 2025-02-25 DIAGNOSIS — R05.3 CHRONIC COUGH: ICD-10-CM

## 2025-02-25 DIAGNOSIS — J45.20 MILD INTERMITTENT ASTHMA, UNSPECIFIED WHETHER COMPLICATED: Primary | ICD-10-CM

## 2025-02-25 DIAGNOSIS — D72.19 PERIPHERAL EOSINOPHILIA: ICD-10-CM

## 2025-02-25 LAB
FEF25-27, POC: 0.7 L/S
FET, POC: NORMAL
FEV 1 , POC: 1.09 L
FEV1/FVC, POC: NORMAL
FVC, POC: NORMAL
LUNG AGE, POC: NORMAL
PEF, POC: 1.97 L/S

## 2025-02-25 PROCEDURE — 1090F PRES/ABSN URINE INCON ASSESS: CPT | Performed by: INTERNAL MEDICINE

## 2025-02-25 PROCEDURE — G8427 DOCREV CUR MEDS BY ELIG CLIN: HCPCS | Performed by: INTERNAL MEDICINE

## 2025-02-25 PROCEDURE — 1160F RVW MEDS BY RX/DR IN RCRD: CPT | Performed by: INTERNAL MEDICINE

## 2025-02-25 PROCEDURE — 99215 OFFICE O/P EST HI 40 MIN: CPT | Performed by: INTERNAL MEDICINE

## 2025-02-25 PROCEDURE — G8399 PT W/DXA RESULTS DOCUMENT: HCPCS | Performed by: INTERNAL MEDICINE

## 2025-02-25 PROCEDURE — 1036F TOBACCO NON-USER: CPT | Performed by: INTERNAL MEDICINE

## 2025-02-25 PROCEDURE — G8417 CALC BMI ABV UP PARAM F/U: HCPCS | Performed by: INTERNAL MEDICINE

## 2025-02-25 PROCEDURE — 1123F ACP DISCUSS/DSCN MKR DOCD: CPT | Performed by: INTERNAL MEDICINE

## 2025-02-25 PROCEDURE — 1159F MED LIST DOCD IN RCRD: CPT | Performed by: INTERNAL MEDICINE

## 2025-02-25 RX ORDER — MONTELUKAST SODIUM 10 MG/1
TABLET ORAL
Qty: 90 TABLET | Refills: 3 | Status: SHIPPED | OUTPATIENT
Start: 2025-02-25

## 2025-02-25 RX ORDER — FLUTICASONE PROPIONATE AND SALMETEROL 500; 50 UG/1; UG/1
1 POWDER RESPIRATORY (INHALATION) 2 TIMES DAILY
Qty: 1 EACH | Refills: 11 | Status: SHIPPED | OUTPATIENT
Start: 2025-02-25

## 2025-02-26 ENCOUNTER — TELEPHONE (OUTPATIENT)
Dept: PULMONOLOGY | Age: 80
End: 2025-02-26

## 2025-02-26 NOTE — TELEPHONE ENCOUNTER
TRIAGE CALL      Complaint: Coughing/asthma  Cough: yes  Productive:  yes  Bloody Sputum:  no  Increased SOB/Wheezing:  yes  Duration: 1 years  Fever/Chills: no  OTC Meds tried: none  Patient was in yesterday.  She felt bad & left after watching video.  Asking if there is anything to help her with her asthma

## 2025-02-28 RX ORDER — PREDNISONE 20 MG/1
20 TABLET ORAL DAILY
Qty: 15 TABLET | Refills: 0 | Status: SHIPPED | OUTPATIENT
Start: 2025-02-28

## 2025-02-28 NOTE — TELEPHONE ENCOUNTER
Steroid taper sent to pharmacy on file.  Advised to call back at end of course if not doing better.

## 2025-02-28 NOTE — TELEPHONE ENCOUNTER
Kristina Christensen, APRN - CNP   to [facility] Triage     2/28/25  1:03 PM  She absolutely qualifies for dupixent and would likely be very helpful for her. If she is willing, can we get [place] to help with this?  Can do the nebulizer multiple times per day but this is temporary.  Looks like Dr Louie strongly recommended the du pixent as well. We can also send steroid taper.

## 2025-02-28 NOTE — TELEPHONE ENCOUNTER
Able to reach patient and clarify that she is interested in anything to help control her asthma. She has watched the video on Dupixent but feels like she does not qualify; expresses frustration that the tx she has used for the last 20 years are not working anymore.   Asking for something, anything that can help today, sent to pharmacy; has a nebulizer that she hasn't used in years. Notes asthma attack yesterday that was difficult to cope with, her spouse is having health issues as well & noting stressors of several things affecting her will to live, the work of breathing is very challenging for her at this time.   Her main focus today is to find a way to improve her breathing today so that she can assist her to care for her spouse.  Willing to try steroid course if recommended. Please advise.

## 2025-03-03 ENCOUNTER — TELEPHONE (OUTPATIENT)
Dept: GASTROENTEROLOGY | Age: 80
End: 2025-03-03

## 2025-03-03 NOTE — TELEPHONE ENCOUNTER
Called received to cancel procedure due to pt having the flu. States she will call back to reschedule.Case message sent cancelling procedure.

## 2025-03-06 DIAGNOSIS — J45.20 MILD INTERMITTENT ASTHMA, UNSPECIFIED WHETHER COMPLICATED: ICD-10-CM

## 2025-03-06 NOTE — TELEPHONE ENCOUNTER
Patient called refill line requesting Sprivia and Advair to be sent to [facility]. She stated she can not pick it up at this time, please contact patient.     Pharmacy: [facility] in [facility]

## 2025-03-10 ENCOUNTER — CARE COORDINATION (OUTPATIENT)
Dept: CARE COORDINATION | Facility: CLINIC | Age: 80
End: 2025-03-10

## 2025-03-10 NOTE — CARE COORDINATION
Care Transitions Note    Initial Call - Call within 2 business days of discharge: Yes    Patient Current Location:  Home: [address] [place] [id number]    Care Transition Nurse contacted the patient by telephone to perform post hospital discharge assessment, verified name and  as identifiers. Provided introduction to self, and explanation of the Care Transition Nurse role.     Patient: Genevieve Long    Patient : 1945   MRN: [id number]    Reason for Admission: Acute respiratory failure with hypoxia, Influenza A   Discharge Date: 3/08/2025 RURS: No data recorded      Was this an external facility discharge?  [facility]     Additional needs identified to be addressed with provider   No needs identified             Method of communication with provider: none.    Patients top risk factors for readmission: medical condition- Acute respiratory failure with hypoxia, Influenza A     Interventions to address risk factors:   DME: O2 received from [facility]  Reviewed discharge instructions and offered opportunity to ask any questions regarding discharge instructions.  Confirmed medications obtained and taking as ordered per [facility] d/c orders:  Amoxicillin-clavulanate 875-125 mg Take 1 tablet twice daily  Prednisone 20 mg take 2 tablets daily for 4 days.  Reviewed upcoming follow up appointments:  Patient already scheduled for AWV 3/17/2025 at 4:00 pm. Notified PCP office of patient's d/c from [facility] on 3/8/2025. Patient to keep appointment as scheduled for Hospital f/u unless office calls to change.  Patient/Family to contact CTN with any issues, questions, or concerns prior to next outreach.    Care Transition Nurse reviewed discharge instructions, medical action plan, and red flags with patient. The patient was given an opportunity to ask questions; no further questions or concerns at this time.. The patient verbalized understanding.   Were discharge instructions available to patient? Yes.

## 2025-03-10 NOTE — CARE COORDINATION
Care Transitions Note    Initial Call - Call within 2 business days of discharge: Yes    Attempted to reach patient for transitions of care follow up. Unable to reach patient.    Outreach Attempts:   HIPAA compliant voicemail left for patient.     Patient: Genevieve Long    Patient : 1945   MRN: [id number]    Reason for Admission: Acute respiratory failure with hypoxia, Influenza A  Discharge Date: 3/8/2025 RURS: No data recorded    Was this an external facility discharge? Yes. Discharge Date: 3/8/2025. Facility Name: [facility]    Follow Up Appointment:   Patient has hospital follow up appointment scheduled within 7 days of discharge.    Future Appointments         Provider Specialty Dept Phone    3/17/2025 4:00 PM Lalito Solis MD Internal Medicine [phone]    3/18/2025 10:30 AM Eladia Gibbons [facility] Audiology [phone]            Plan for follow-up on next business day.      Henrietta David RN

## 2025-03-11 ENCOUNTER — CARE COORDINATION (OUTPATIENT)
Dept: CARE COORDINATION | Facility: CLINIC | Age: 80
End: 2025-03-11

## 2025-03-13 ENCOUNTER — LAB (OUTPATIENT)
Dept: FAMILY MEDICINE CLINIC | Facility: CLINIC | Age: 80
End: 2025-03-13

## 2025-03-13 ENCOUNTER — OFFICE VISIT (OUTPATIENT)
Dept: INTERNAL MEDICINE CLINIC | Facility: CLINIC | Age: 80
End: 2025-03-13

## 2025-03-13 VITALS
BODY MASS INDEX: 32.66 KG/M2 | HEIGHT: 61 IN | DIASTOLIC BLOOD PRESSURE: 65 MMHG | OXYGEN SATURATION: 95 % | SYSTOLIC BLOOD PRESSURE: 147 MMHG | WEIGHT: 173 LBS | TEMPERATURE: 98.3 F | HEART RATE: 93 BPM

## 2025-03-13 DIAGNOSIS — Z09 HOSPITAL DISCHARGE FOLLOW-UP: Primary | ICD-10-CM

## 2025-03-13 DIAGNOSIS — R06.01 ORTHOPNEA: ICD-10-CM

## 2025-03-13 DIAGNOSIS — R60.1 GENERALIZED EDEMA: ICD-10-CM

## 2025-03-13 DIAGNOSIS — J96.11 CHRONIC RESPIRATORY FAILURE WITH HYPOXIA (HCC): ICD-10-CM

## 2025-03-13 DIAGNOSIS — J45.909 UNCOMPLICATED ASTHMA, UNSPECIFIED ASTHMA SEVERITY, UNSPECIFIED WHETHER PERSISTENT: ICD-10-CM

## 2025-03-13 DIAGNOSIS — J11.1 INFLUENZA: ICD-10-CM

## 2025-03-13 DIAGNOSIS — F33.1 MAJOR DEPRESSIVE DISORDER, RECURRENT, MODERATE (HCC): ICD-10-CM

## 2025-03-13 RX ORDER — FUROSEMIDE 40 MG/1
40 TABLET ORAL DAILY
Qty: 60 TABLET | Refills: 3 | Status: SHIPPED | OUTPATIENT
Start: 2025-03-13

## 2025-03-13 SDOH — ECONOMIC STABILITY: FOOD INSECURITY: WITHIN THE PAST 12 MONTHS, YOU WORRIED THAT YOUR FOOD WOULD RUN OUT BEFORE YOU GOT MONEY TO BUY MORE.: NEVER TRUE

## 2025-03-13 SDOH — ECONOMIC STABILITY: FOOD INSECURITY: WITHIN THE PAST 12 MONTHS, THE FOOD YOU BOUGHT JUST DIDN'T LAST AND YOU DIDN'T HAVE MONEY TO GET MORE.: NEVER TRUE

## 2025-03-13 NOTE — PROGRESS NOTES
[facility]  [address] [place] [address]  Phone: [phone]  Fax: [phone]      Problem Based Overview with Integrated Assessment and Plan    Genevieve Long is a 79 y.o. year old female who  presents for follow-up after a recent hospitalization for pneumonia and COPD exacerbation.     She reports having been admitted to [facility] last Friday for flu and pneumonia, staying for one night before being discharged on oxygen therapy. She completed her course of prednisone yesterday.    Genevieve mentions experiencing shortness of breath, particularly when lying flat. She states she has always slept in an upright position due to her asthma. She reports her oxygen levels have been around 90-91 with oxygen therapy.    Genevieve notes significant swelling in her legs and feet since her hospital admission, stating she can't wear shoes due to the swelling. She also mentions that her asthma has been particularly troublesome during this illness.      Objective  Vital Signs:  Oxygen saturation: 90-91% on 3 liters of oxygen  Extremities: Significant bilateral leg and foot swelling noted  Diagnostic Results:  Previous chest x-ray showed right lower lobe infiltrate (from hospital records)  Oxygen saturation without supplemental oxygen: Dropped to 85% after 10 minutes of ambulation    Assessment  Pneumonia, resolving (J15.9)  Recently completed course of antibiotics  Persistent oxygen requirement  Right lower lobe infiltrate on previous imaging  COPD Exacerbation (J44.1)  Recent hospitalization  Ongoing oxygen requirement  History of asthma contributing to respiratory issues  Peripheral Edema (R60.0)  New onset of bilateral leg and foot swelling  Possibly related to recent illness or medications  Asthma (J45.909)  Longstanding history  Exacerbated by recent respiratory infection  Possible Heart Failure (I50.9)  New onset peripheral edema  Orthopnea (difficulty breathing

## 2025-03-14 ENCOUNTER — CARE COORDINATION (OUTPATIENT)
Dept: CARE COORDINATION | Facility: CLINIC | Age: 80
End: 2025-03-14

## 2025-03-14 NOTE — CARE COORDINATION
Care Transitions Note    Follow Up Call     Patient Current Location:  Home: [address] [place] [id number]    Care Transition Nurse contacted the patient by telephone. Verified name and  as identifiers.    Additional needs identified to be addressed with provider   No needs identified                 Method of communication with provider: none.    Plan of care updates since last contact:  Patient reports no concerns at time of call. Attended PCP hospital follow up as scheduled 3/13/2025 with Furosemide 40 mg ordered po once daily. Patient to obtain medications today. Scheduled with Cardiology for ECHO today at 3:30 pm and confirmed she will attend.  O2 at 91% on 3L.  Continue taking all medications as ordered  Continue with adequate nutritional and fluid intake  Patient/Family to contact CTN with any issues, questions, or concerns prior to next outreach.       Advance Care Planning:   Does patient have an Advance Directive: reviewed during previous call, see note. .    Medication Review:  Medications changed since last call, reviewed today.     Remote Patient Monitoring:  Offered patient enrollment in the Remote Patient Monitoring (RPM) program for in-home monitoring: Yes, but did not enroll at this time: already monitoring with home equipment.    Assessments:  Care Transitions Subsequent and Final Call    Schedule Follow Up Appointment with PCP: Completed  Subsequent and Final Calls  Do you have any ongoing symptoms?: No  Have your medications changed?: Yes  Patient Reports: Furosemide 40 mg Oral  daily  Do you have any questions related to your medications?: No  Do you currently have any active services?: No  Do you have any needs or concerns that I can assist you with?: No  Care Transitions Interventions  Other Interventions:             Goals Addressed                   This Visit's Progress     Returns to baseline activity level.   On track     Patient/Family able to obtain medicine after d/c

## 2025-03-17 ENCOUNTER — RESULTS FOLLOW-UP (OUTPATIENT)
Dept: INTERNAL MEDICINE CLINIC | Facility: CLINIC | Age: 80
End: 2025-03-17

## 2025-03-18 ENCOUNTER — RESULTS FOLLOW-UP (OUTPATIENT)
Dept: CARDIOLOGY CLINIC | Age: 80
End: 2025-03-18

## 2025-03-18 ENCOUNTER — TELEPHONE (OUTPATIENT)
Dept: INTERNAL MEDICINE CLINIC | Facility: CLINIC | Age: 80
End: 2025-03-18

## 2025-03-18 DIAGNOSIS — R60.1 GENERALIZED EDEMA: ICD-10-CM

## 2025-03-18 DIAGNOSIS — R06.01 ORTHOPNEA: ICD-10-CM

## 2025-03-18 DIAGNOSIS — J96.11 CHRONIC RESPIRATORY FAILURE WITH HYPOXIA: Primary | ICD-10-CM

## 2025-03-18 RX ORDER — FLUTICASONE PROPIONATE AND SALMETEROL 500; 50 UG/1; UG/1
1 POWDER RESPIRATORY (INHALATION) 2 TIMES DAILY
Qty: 1 EACH | Refills: 11 | Status: SHIPPED | OUTPATIENT
Start: 2025-03-18

## 2025-03-18 NOTE — TELEPHONE ENCOUNTER
----- Message from Aida JEROME sent at 3/18/2025  2:29 PM EDT -----  Regarding: RE: Lab Reorder  So this is one where you will put the order back in and let them know they have been re-ordered and they will call the patient.  ----- Message -----  From: Ila Chapa LPN  Sent: 3/18/2025  10:09 AM EDT  To: Aida Davey  Subject: FW: Lab Reorder                                    ----- Message -----  From: Lucero Brady  Sent: 3/13/2025   2:36 PM EDT  To: #  Subject: Lab Reorder                                      The following lab test(s) were not completed.  Lab tests:  Pro-BNP, CBC, CMP  Recollect reason: unable to obtain blood, patient left without having labs collected.    Please consult with the ordering physician/APC if the tests are needed.    If this test requires recollection, please re-enter order in Epic within 24 hours of this notice and respond to this message as Client Services will contact the patient.     If this test DOES NOT require recollection, document this in Epic documentation only encounter.    If you need additional information, respond to this message and/or call [phone].    [facility] Client Services

## 2025-03-21 ENCOUNTER — CARE COORDINATION (OUTPATIENT)
Dept: CARE COORDINATION | Facility: CLINIC | Age: 80
End: 2025-03-21

## 2025-03-21 NOTE — CARE COORDINATION
Care Transitions Note    Follow Up Call     Attempted to reach patient for transitions of care follow up.  Unable to reach patient.      Outreach Attempts:   Unable to leave message.       Follow Up Appointment:   Future Appointments         Provider Specialty Dept Phone    3/27/2025 3:30 PM Lalito Solis MD Internal Medicine [phone]            Plan for follow-up call in 6-10 days based on severity of symptoms and risk factors. Plan for next call: symptom management    Lyly Lovell LPN

## 2025-03-26 NOTE — TELEPHONE ENCOUNTER
Patient notified about results and verbalized understanding    Patient stated that she is aware of the hiatal hernia, she also stated that she does not agree with the \"findings of consistent with COPD\".    Patient is not sure if she is coming to  her upcoming f/u, she said she may f/u in a few months.

## 2025-03-26 NOTE — TELEPHONE ENCOUNTER
----- Message from Dr. Lalito Solis MD sent at 3/17/2025  9:52 AM EDT -----  Please call patient and inform that her xray did not show any acute abnormality. No pneumonia.  - it does show findings consistent with COPD  - it also shows a hiatal hernia

## 2025-03-28 ENCOUNTER — CARE COORDINATION (OUTPATIENT)
Dept: CARE COORDINATION | Facility: CLINIC | Age: 80
End: 2025-03-28

## 2025-03-28 NOTE — CARE COORDINATION
Care Transitions Note    Follow Up Call     Attempted to reach patient for transitions of care follow up.  Unable to reach patient.      Outreach Attempts:   Unable to leave message.     Follow Up Appointment:   Future Appointments         Provider Specialty Dept Phone    4/10/2025 1:40 PM Lalito Solis MD Internal Medicine [phone]            Plan for follow-up call in 6-10 days based on severity of symptoms and risk factors. Plan for next call: symptom management    Lyly Lovell LPN

## 2025-04-04 ENCOUNTER — CARE COORDINATION (OUTPATIENT)
Dept: CARE COORDINATION | Facility: CLINIC | Age: 80
End: 2025-04-04

## 2025-04-04 NOTE — CARE COORDINATION
Care Transitions Note    Follow Up Call     Attempted to reach patient for transitions of care follow up.  Unable to reach patient.      Outreach Attempts:   Unable to leave message.  Line is busy.  Patient is scheduled with PCP on 4/10/25 at 2pm.      Follow Up Appointment:   Future Appointments         Provider Specialty Dept Phone    4/10/2025 2:00 PM Lalito Solis MD Internal Medicine [phone]            Plan for follow-up call in 2-5 days based on severity of symptoms and risk factors. Plan for next call: symptom management    Lyly Lovell LPN

## 2025-04-04 NOTE — TELEPHONE ENCOUNTER
----- Message from Dr. Lalito Solis MD sent at 3/18/2025 11:29 AM EDT -----  Please call patient and inform that her echocardiogram results were normal appearing. She has normal heart function.

## 2025-04-09 ENCOUNTER — CARE COORDINATION (OUTPATIENT)
Dept: CARE COORDINATION | Facility: CLINIC | Age: 80
End: 2025-04-09

## 2025-04-09 NOTE — CARE COORDINATION
Care Transitions Note    Final Call     Attempted to reach patient for transitions of care follow up.  Unable to reach patient.      Outreach Attempts:     Unable to leave message.     Patient graduated from the Care Transitions program on 4/9/25.  Patient/family has the ability to self-manage at this time..      Handoff:   Patient was not referred to the ACM team due to unable to contact patient.      Care Summary Note: Unable to reach patient for final EZEKIEL outreach.  Patient is scheduled with PCP on 4/10/25.  Per chart review patient's echo showed normal heart function.      Assessments:   Goals Addressed                   This Visit's Progress     COMPLETED: Returns to baseline activity level.   On track     Patient/Family able to obtain medicine after d/c     Patient/Family able to verbalize medicine changes     Patient/Family aware and attends follow up appointments s/p d/c.     Patient/Family agrees to notify provider of any barriers to plan of care.    Patient/Family agrees to notify provider of any symptoms that indicate a worsening of condition    Patient/Family agrees to monitor and record O2 for MD review.                Upcoming Appointments:    Future Appointments         Provider Specialty Dept Phone    4/10/2025 2:00 PM Lalito Solis MD Internal Medicine [phone]    4/30/2025 3:00 PM Eladia Gibbons [facility] Audiology [phone]            Lyly Lovell LPN

## 2025-05-01 ENCOUNTER — LAB (OUTPATIENT)
Dept: FAMILY MEDICINE CLINIC | Facility: CLINIC | Age: 80
End: 2025-05-01

## 2025-05-01 ENCOUNTER — OFFICE VISIT (OUTPATIENT)
Dept: INTERNAL MEDICINE CLINIC | Facility: CLINIC | Age: 80
End: 2025-05-01
Payer: MEDICARE

## 2025-05-01 VITALS
WEIGHT: 167 LBS | DIASTOLIC BLOOD PRESSURE: 60 MMHG | BODY MASS INDEX: 30.73 KG/M2 | SYSTOLIC BLOOD PRESSURE: 130 MMHG | OXYGEN SATURATION: 97 % | TEMPERATURE: 98.2 F | HEIGHT: 62 IN | HEART RATE: 88 BPM

## 2025-05-01 DIAGNOSIS — F34.1 PERSISTENT DEPRESSIVE DISORDER: ICD-10-CM

## 2025-05-01 DIAGNOSIS — F41.3 OTHER MIXED ANXIETY DISORDERS: ICD-10-CM

## 2025-05-01 DIAGNOSIS — R60.1 GENERALIZED EDEMA: ICD-10-CM

## 2025-05-01 DIAGNOSIS — R06.01 ORTHOPNEA: ICD-10-CM

## 2025-05-01 DIAGNOSIS — J45.909 UNCOMPLICATED ASTHMA, UNSPECIFIED ASTHMA SEVERITY, UNSPECIFIED WHETHER PERSISTENT: Primary | ICD-10-CM

## 2025-05-01 DIAGNOSIS — R03.0 ELEVATED BP WITHOUT DIAGNOSIS OF HYPERTENSION: ICD-10-CM

## 2025-05-01 DIAGNOSIS — K21.9 GASTROESOPHAGEAL REFLUX DISEASE, UNSPECIFIED WHETHER ESOPHAGITIS PRESENT: ICD-10-CM

## 2025-05-01 DIAGNOSIS — J96.11 CHRONIC RESPIRATORY FAILURE WITH HYPOXIA (HCC): ICD-10-CM

## 2025-05-01 DIAGNOSIS — G47.33 OSA (OBSTRUCTIVE SLEEP APNEA): ICD-10-CM

## 2025-05-01 PROBLEM — J11.1 INFLUENZA: Status: RESOLVED | Noted: 2025-03-13 | Resolved: 2025-05-01

## 2025-05-01 LAB
ALBUMIN SERPL-MCNC: 3.9 G/DL (ref 3.2–4.6)
ALBUMIN/GLOB SERPL: 1.4 (ref 1–1.9)
ALP SERPL-CCNC: 61 U/L (ref 35–104)
ALT SERPL-CCNC: 14 U/L (ref 8–45)
ANION GAP SERPL CALC-SCNC: 13 MMOL/L (ref 7–16)
AST SERPL-CCNC: 26 U/L (ref 15–37)
BASOPHILS # BLD: 0.04 K/UL (ref 0–0.2)
BASOPHILS NFR BLD: 0.8 % (ref 0–2)
BILIRUB SERPL-MCNC: 0.5 MG/DL (ref 0–1.2)
BUN SERPL-MCNC: 10 MG/DL (ref 8–23)
CALCIUM SERPL-MCNC: 9.6 MG/DL (ref 8.8–10.2)
CHLORIDE SERPL-SCNC: 105 MMOL/L (ref 98–107)
CO2 SERPL-SCNC: 24 MMOL/L (ref 20–29)
CREAT SERPL-MCNC: 0.78 MG/DL (ref 0.6–1.1)
DIFFERENTIAL METHOD BLD: ABNORMAL
EOSINOPHIL # BLD: 0.65 K/UL (ref 0–0.8)
EOSINOPHIL NFR BLD: 13.1 % (ref 0.5–7.8)
ERYTHROCYTE [DISTWIDTH] IN BLOOD BY AUTOMATED COUNT: 12.1 % (ref 11.9–14.6)
GLOBULIN SER CALC-MCNC: 2.9 G/DL (ref 2.3–3.5)
GLUCOSE SERPL-MCNC: 96 MG/DL (ref 70–99)
HCT VFR BLD AUTO: 34.5 % (ref 35.8–46.3)
HGB BLD-MCNC: 11.4 G/DL (ref 11.7–15.4)
IMM GRANULOCYTES # BLD AUTO: 0.01 K/UL (ref 0–0.5)
IMM GRANULOCYTES NFR BLD AUTO: 0.2 % (ref 0–5)
LYMPHOCYTES # BLD: 1.92 K/UL (ref 0.5–4.6)
LYMPHOCYTES NFR BLD: 38.6 % (ref 13–44)
MCH RBC QN AUTO: 28.6 PG (ref 26.1–32.9)
MCHC RBC AUTO-ENTMCNC: 33 G/DL (ref 31.4–35)
MCV RBC AUTO: 86.7 FL (ref 82–102)
MONOCYTES # BLD: 0.35 K/UL (ref 0.1–1.3)
MONOCYTES NFR BLD: 7 % (ref 4–12)
NEUTS SEG # BLD: 2.01 K/UL (ref 1.7–8.2)
NEUTS SEG NFR BLD: 40.3 % (ref 43–78)
NRBC # BLD: 0 K/UL (ref 0–0.2)
NT PRO BNP: 135 PG/ML (ref 0–450)
PLATELET # BLD AUTO: 242 K/UL (ref 150–450)
PMV BLD AUTO: 10.7 FL (ref 9.4–12.3)
POTASSIUM SERPL-SCNC: 3.8 MMOL/L (ref 3.5–5.1)
PROT SERPL-MCNC: 6.8 G/DL (ref 6.3–8.2)
RBC # BLD AUTO: 3.98 M/UL (ref 4.05–5.2)
SODIUM SERPL-SCNC: 142 MMOL/L (ref 136–145)
TSH W FREE THYROID IF ABNORMAL: 1.41 UIU/ML (ref 0.27–4.2)
WBC # BLD AUTO: 5 K/UL (ref 4.3–11.1)

## 2025-05-01 PROCEDURE — 1090F PRES/ABSN URINE INCON ASSESS: CPT | Performed by: INTERNAL MEDICINE

## 2025-05-01 PROCEDURE — G8427 DOCREV CUR MEDS BY ELIG CLIN: HCPCS | Performed by: INTERNAL MEDICINE

## 2025-05-01 PROCEDURE — 1159F MED LIST DOCD IN RCRD: CPT | Performed by: INTERNAL MEDICINE

## 2025-05-01 PROCEDURE — G8417 CALC BMI ABV UP PARAM F/U: HCPCS | Performed by: INTERNAL MEDICINE

## 2025-05-01 PROCEDURE — 1123F ACP DISCUSS/DSCN MKR DOCD: CPT | Performed by: INTERNAL MEDICINE

## 2025-05-01 PROCEDURE — G2211 COMPLEX E/M VISIT ADD ON: HCPCS | Performed by: INTERNAL MEDICINE

## 2025-05-01 PROCEDURE — 99214 OFFICE O/P EST MOD 30 MIN: CPT | Performed by: INTERNAL MEDICINE

## 2025-05-01 PROCEDURE — G8399 PT W/DXA RESULTS DOCUMENT: HCPCS | Performed by: INTERNAL MEDICINE

## 2025-05-01 PROCEDURE — 1036F TOBACCO NON-USER: CPT | Performed by: INTERNAL MEDICINE

## 2025-05-01 RX ORDER — ARIPIPRAZOLE 5 MG/1
5 TABLET ORAL DAILY
Qty: 30 TABLET | Refills: 3 | Status: SHIPPED | OUTPATIENT
Start: 2025-05-01

## 2025-05-01 SDOH — ECONOMIC STABILITY: FOOD INSECURITY: WITHIN THE PAST 12 MONTHS, THE FOOD YOU BOUGHT JUST DIDN'T LAST AND YOU DIDN'T HAVE MONEY TO GET MORE.: NEVER TRUE

## 2025-05-01 SDOH — ECONOMIC STABILITY: FOOD INSECURITY: WITHIN THE PAST 12 MONTHS, YOU WORRIED THAT YOUR FOOD WOULD RUN OUT BEFORE YOU GOT MONEY TO BUY MORE.: NEVER TRUE

## 2025-05-01 NOTE — PROGRESS NOTES
[facility]  [address] [place] [address]  Phone: [phone]  Fax: [phone]      Problem Based Overview with Integrated Assessment and Plan    History of Present Illness  The patient presents for evaluation of depression, anxiety, asthma, and health maintenance.    Hearing Loss  - Chief complaint includes hearing loss, with an ENT appointment scheduled for 06/2025.  - Transportation issues have prevented attendance at appointments, relying on her  for mobility.  - Calls from ambulatory care at [facility] recommended seeing a senior citizen specialist if appointments are missed.  - Efforts are made to attend all appointments.    Oxygen Therapy  - Reassessment of oxygen therapy is requested, as oxygen saturation levels have been consistently high, with a reading of 98 this morning.  - Respiratory failure during a hospital stay was not communicated, nor was the need for oxygen therapy upon discharge.  - Significant improvement in breathing is reported, with a pulmonologist appointment scheduled for 06/2025.  - Oxygen therapy was discontinued prior to today's visit due to inconvenience but will be resumed until the condition stabilizes.  - Plans to use oxygen at night are mentioned, despite the absence of sleep apnea.  - Difficulty in securing an appointment with pulmonary she states  - Plans to inform her of the COPD diagnosis are mentioned, although asthma is still believed to be present.  - Anticipation of a potential change in medication regimen is expressed.    Asthma  - Lifelong history of asthma is reported, dating back to the first hospital visit at age 7.  - Asthma was managed well during working years, but a 20-pound weight gain after [facility] exacerbated symptoms.  - Acknowledgment of the benefits of weight loss is made, though it is found challenging.    Depression and Anxiety  - Previous treatment for depression and anxiety was discontinued due to lack

## 2025-05-02 ENCOUNTER — RESULTS FOLLOW-UP (OUTPATIENT)
Dept: INTERNAL MEDICINE CLINIC | Facility: CLINIC | Age: 80
End: 2025-05-02

## 2025-05-13 NOTE — PROGRESS NOTES
mg, Oral, 2 TIMES DAILY BEFORE MEALS    OXYGEN Use as instructed.    tiotropium (SPIRIVA RESPIMAT) 2.5 MCG/ACT AERS inhaler 2 puffs, Inhalation, DAILY

## 2025-05-15 ENCOUNTER — TELEPHONE (OUTPATIENT)
Dept: PULMONOLOGY | Age: 80
End: 2025-05-15

## 2025-05-15 ENCOUNTER — OFFICE VISIT (OUTPATIENT)
Dept: PULMONOLOGY | Age: 80
End: 2025-05-15
Payer: MEDICARE

## 2025-05-15 VITALS
SYSTOLIC BLOOD PRESSURE: 130 MMHG | RESPIRATION RATE: 20 BRPM | OXYGEN SATURATION: 96 % | WEIGHT: 167 LBS | TEMPERATURE: 98 F | DIASTOLIC BLOOD PRESSURE: 80 MMHG | HEIGHT: 61 IN | BODY MASS INDEX: 31.53 KG/M2 | HEART RATE: 95 BPM

## 2025-05-15 DIAGNOSIS — R05.3 CHRONIC COUGH: ICD-10-CM

## 2025-05-15 DIAGNOSIS — J45.50 SEVERE PERSISTENT ASTHMA WITHOUT COMPLICATION (HCC): Primary | ICD-10-CM

## 2025-05-15 DIAGNOSIS — D72.19 PERIPHERAL EOSINOPHILIA: ICD-10-CM

## 2025-05-15 PROCEDURE — 1123F ACP DISCUSS/DSCN MKR DOCD: CPT | Performed by: INTERNAL MEDICINE

## 2025-05-15 PROCEDURE — G8427 DOCREV CUR MEDS BY ELIG CLIN: HCPCS | Performed by: INTERNAL MEDICINE

## 2025-05-15 PROCEDURE — 99215 OFFICE O/P EST HI 40 MIN: CPT | Performed by: INTERNAL MEDICINE

## 2025-05-15 PROCEDURE — 1090F PRES/ABSN URINE INCON ASSESS: CPT | Performed by: INTERNAL MEDICINE

## 2025-05-15 PROCEDURE — 1036F TOBACCO NON-USER: CPT | Performed by: INTERNAL MEDICINE

## 2025-05-15 PROCEDURE — G8399 PT W/DXA RESULTS DOCUMENT: HCPCS | Performed by: INTERNAL MEDICINE

## 2025-05-15 PROCEDURE — G8417 CALC BMI ABV UP PARAM F/U: HCPCS | Performed by: INTERNAL MEDICINE

## 2025-05-15 RX ORDER — FLUTICASONE PROPIONATE AND SALMETEROL 500; 50 UG/1; UG/1
1 POWDER RESPIRATORY (INHALATION) 2 TIMES DAILY
Qty: 1 EACH | Refills: 11 | Status: CANCELLED | OUTPATIENT
Start: 2025-05-15

## 2025-05-15 NOTE — PATIENT INSTRUCTIONS
Today we are going to start dupixent for asthma, which I think is a partial cause of your cough.  I think it is a safe option that could make you feel worlds better when it comes to your breathing AND cough.  Please try it for 3-6 months.  We will reassess your cough and your shortness of breath soon.  Usually it takes several months for the dupixent to have full effect.    Please use something for acid reflux since you have this cough and a hiatal hernia.  It probably worsens the dough.    If not prilosec 40mg, try using gaviscon at night or reflux gourmet at bedtime.      Gastroesophageal reflux disease in adults (Beyond the Basics)        GASTROESOPHAGEAL REFLUX OVERVIEW    Gastroesophageal reflux, also called \"acid reflux,\" occurs when the stomach contents back up into the esophagus and/or mouth. Occasional reflux is normal and can happen in healthy infants, children, and adults, most often after eating a large meal. Most episodes are brief and do not cause bothersome symptoms or complications.    By contrast, people with gastroesophageal reflux disease (GERD) experience bothersome symptoms or damage to the esophagus as a result of acid reflux. Symptoms of GERD can include heartburn, regurgitation, and difficulty or pain with swallowing.      WHAT HAPPENS IN ACID REFLUX AND GERD?    When you eat, food is carried from your mouth to your stomach through the esophagus, a tube-like structure that is approximately 10 inches long and 1 inch wide in adults. The esophagus is made of tissue and muscle layers that expand and contract to propel food to your stomach through a series of wave-like movements called peristalsis.    At the lower end of the esophagus, where it connects to the stomach, there is a circular ring of muscle called the lower esophageal sphincter (LES). After you swallow, the LES relaxes and opens to allow food to enter your stomach, where it mixes with acids that help with digestion. The LES then

## 2025-05-15 NOTE — TELEPHONE ENCOUNTER
Patient was seen by Dr. Louie today and it was decided to start the first sample of the Dupixent injection with assistance. Patient waited 20 minutes after injection to verify any reaction. Patient tolerated the injection well and did not show any signs or symptoms of distress. // Fatou DE DIOS

## 2025-05-16 DIAGNOSIS — J45.50 SEVERE PERSISTENT ASTHMA WITHOUT COMPLICATION (HCC): Primary | ICD-10-CM

## 2025-05-16 NOTE — TELEPHONE ENCOUNTER
New Enrollment for Dupixent - Asthma  EOS: 650 on 5/1/25    Patient received 1st dose in office.    New Scripts pended for Dr. Louie's signature.

## 2025-05-18 RX ORDER — ALBUTEROL SULFATE 90 UG/1
2 INHALANT RESPIRATORY (INHALATION) EVERY 6 HOURS PRN
Qty: 1 EACH | Refills: 11 | Status: SHIPPED | OUTPATIENT
Start: 2025-05-18

## 2025-05-18 RX ORDER — FLUTICASONE PROPIONATE AND SALMETEROL XINAFOATE 230; 21 UG/1; UG/1
2 AEROSOL, METERED RESPIRATORY (INHALATION) 2 TIMES DAILY
Qty: 3 EACH | Refills: 3 | Status: SHIPPED | OUTPATIENT
Start: 2025-05-18

## 2025-05-21 DIAGNOSIS — J45.50 SEVERE PERSISTENT ASTHMA WITHOUT COMPLICATION (HCC): ICD-10-CM

## 2025-05-21 DIAGNOSIS — D72.19 PERIPHERAL EOSINOPHILIA: ICD-10-CM

## 2025-05-21 NOTE — TELEPHONE ENCOUNTER
Spoke to patient and let her know when Dr. Louie was refilling mrs ayala's wixela and an alert for allergy to milk protein came up. She should probably be on advair HFA which doesn't have lactose. She states she would like to get the Advair at [facility].     New rx placed and routed to Dr. Louie to sign and she voices understanding.

## 2025-05-21 NOTE — TELEPHONE ENCOUNTER
----- Message from Dr. Yulia Louie MD sent at 5/18/2025  4:56 PM EDT -----  I was refilling mrs ayala's wixela and an alert for allergy to milk protein came up.  She should probably be on advair HFA which doesn't have lactose.  Also, please check to make sure her inhalers went to the right pharmacy.  I [facility] the [facility]air HFA to [facility].  Make sure this is okay with her.

## 2025-05-22 RX ORDER — FLUTICASONE PROPIONATE AND SALMETEROL XINAFOATE 230; 21 UG/1; UG/1
2 AEROSOL, METERED RESPIRATORY (INHALATION) 2 TIMES DAILY
Qty: 3 EACH | Refills: 3 | Status: SHIPPED | OUTPATIENT
Start: 2025-05-22

## 2025-05-27 ENCOUNTER — TELEPHONE (OUTPATIENT)
Dept: GASTROENTEROLOGY | Age: 80
End: 2025-05-27

## 2025-05-28 ENCOUNTER — PREP FOR PROCEDURE (OUTPATIENT)
Dept: GASTROENTEROLOGY | Age: 80
End: 2025-05-28

## 2025-05-28 RX ORDER — SODIUM CHLORIDE 0.9 % (FLUSH) 0.9 %
5-40 SYRINGE (ML) INJECTION EVERY 12 HOURS SCHEDULED
OUTPATIENT
Start: 2025-05-28

## 2025-05-28 RX ORDER — SODIUM CHLORIDE 0.9 % (FLUSH) 0.9 %
5-40 SYRINGE (ML) INJECTION PRN
OUTPATIENT
Start: 2025-05-28

## 2025-05-28 RX ORDER — SODIUM CHLORIDE 9 MG/ML
25 INJECTION, SOLUTION INTRAVENOUS PRN
OUTPATIENT
Start: 2025-05-28

## 2025-06-02 ENCOUNTER — OFFICE VISIT (OUTPATIENT)
Dept: INTERNAL MEDICINE CLINIC | Facility: CLINIC | Age: 80
End: 2025-06-02
Payer: MEDICARE

## 2025-06-02 VITALS
HEART RATE: 97 BPM | WEIGHT: 165.4 LBS | HEIGHT: 61 IN | DIASTOLIC BLOOD PRESSURE: 71 MMHG | BODY MASS INDEX: 31.23 KG/M2 | TEMPERATURE: 97.8 F | OXYGEN SATURATION: 97 % | SYSTOLIC BLOOD PRESSURE: 169 MMHG

## 2025-06-02 DIAGNOSIS — K21.9 GASTROESOPHAGEAL REFLUX DISEASE, UNSPECIFIED WHETHER ESOPHAGITIS PRESENT: ICD-10-CM

## 2025-06-02 DIAGNOSIS — F33.0 MAJOR DEPRESSIVE DISORDER, RECURRENT, MILD: Primary | ICD-10-CM

## 2025-06-02 DIAGNOSIS — K21.9 GASTROESOPHAGEAL REFLUX DISEASE WITHOUT ESOPHAGITIS: ICD-10-CM

## 2025-06-02 DIAGNOSIS — G89.29 CHRONIC BILATERAL LOW BACK PAIN WITH RIGHT-SIDED SCIATICA: ICD-10-CM

## 2025-06-02 DIAGNOSIS — R03.0 ELEVATED BP WITHOUT DIAGNOSIS OF HYPERTENSION: ICD-10-CM

## 2025-06-02 DIAGNOSIS — M54.41 CHRONIC BILATERAL LOW BACK PAIN WITH RIGHT-SIDED SCIATICA: ICD-10-CM

## 2025-06-02 DIAGNOSIS — J45.909 UNCOMPLICATED ASTHMA, UNSPECIFIED ASTHMA SEVERITY, UNSPECIFIED WHETHER PERSISTENT: ICD-10-CM

## 2025-06-02 DIAGNOSIS — M51.360 DEGENERATION OF INTERVERTEBRAL DISC OF LUMBAR REGION WITH DISCOGENIC BACK PAIN: ICD-10-CM

## 2025-06-02 PROBLEM — F33.1 MAJOR DEPRESSIVE DISORDER, RECURRENT, MODERATE (HCC): Status: RESOLVED | Noted: 2022-05-03 | Resolved: 2025-06-02

## 2025-06-02 PROBLEM — R10.9 FLANK PAIN: Status: RESOLVED | Noted: 2024-11-14 | Resolved: 2025-06-02

## 2025-06-02 PROCEDURE — 1036F TOBACCO NON-USER: CPT | Performed by: INTERNAL MEDICINE

## 2025-06-02 PROCEDURE — 1123F ACP DISCUSS/DSCN MKR DOCD: CPT | Performed by: INTERNAL MEDICINE

## 2025-06-02 PROCEDURE — 1090F PRES/ABSN URINE INCON ASSESS: CPT | Performed by: INTERNAL MEDICINE

## 2025-06-02 PROCEDURE — 1159F MED LIST DOCD IN RCRD: CPT | Performed by: INTERNAL MEDICINE

## 2025-06-02 PROCEDURE — G8399 PT W/DXA RESULTS DOCUMENT: HCPCS | Performed by: INTERNAL MEDICINE

## 2025-06-02 PROCEDURE — G8417 CALC BMI ABV UP PARAM F/U: HCPCS | Performed by: INTERNAL MEDICINE

## 2025-06-02 PROCEDURE — G8427 DOCREV CUR MEDS BY ELIG CLIN: HCPCS | Performed by: INTERNAL MEDICINE

## 2025-06-02 PROCEDURE — 99214 OFFICE O/P EST MOD 30 MIN: CPT | Performed by: INTERNAL MEDICINE

## 2025-06-02 PROCEDURE — G2211 COMPLEX E/M VISIT ADD ON: HCPCS | Performed by: INTERNAL MEDICINE

## 2025-06-02 NOTE — PROGRESS NOTES
[facility]  [address] [place] [address]  Phone: [phone]  Fax: [phone]      Problem Based Overview with Integrated Assessment and Plan      History of Present Illness  The patient presents for evaluation of back pain, asthma, anxiety, elevated blood pressure, and acid reflux.    Back Pain  - Persistent lower back pain, previously evaluated with an MRI 2-3 years ago, revealed a ruptured disc at L1-L3.  - History of surgical intervention for herniated discs at L4 and L5.  - Seeking updated MRI for potential further surgery as per spine specialist Dr. Charles in [place].    Asthma  - Managing asthma with Dupixent, effective.  - Continues to experience cough attributed to heartburn, present for 5 years.    Anxiety  - Prescribed Abilify for anxiety but discontinued after one dose due to drowsiness.  - Significant anxiety related to health conditions, denies depression.    Elevated Blood Pressure  - Slightly elevated blood pressure today.  - Does not monitor blood pressure at home.    Acid Reflux  - Severe acid reflux managed with Pepcid.  - Discontinued Prilosec in 11/2024 after 20 years.  - Uses baking soda and water if Pepcid is ineffective.    Supplemental information: Received pneumonia and COVID-19 vaccines. Interested in recent blood work results, particularly thyroid function, considering surgical intervention for back pain. Scheduled for colonoscopy in late 07/2025 post-pneumonia recovery. Discontinued Lasix as swelling resolved.       Assessment & Plan  1. Back pain.  - Ordered MRI to evaluate need for potential surgery at [place] [facility] with Dr. Charles.  - Advised to schedule MRI and bring results to appointment with Dr. Charles.    2. Asthma.  - Continue Dupixent, reports improvement.  - Elevated eosinophils contributing to symptoms.    3. Anxiety.  - Discontinued Abilify due to drowsiness.  - Reports anxiety related to health issues.    4. Elevated blood

## 2025-06-09 ENCOUNTER — APPOINTMENT (OUTPATIENT)
Dept: URBAN - METROPOLITAN AREA CLINIC 329 | Facility: CLINIC | Age: 80
Setting detail: DERMATOLOGY
End: 2025-06-09

## 2025-06-09 DIAGNOSIS — L57.0 ACTINIC KERATOSIS: ICD-10-CM | Status: INADEQUATELY CONTROLLED

## 2025-06-09 DIAGNOSIS — L85.3 XEROSIS CUTIS: ICD-10-CM | Status: INADEQUATELY CONTROLLED

## 2025-06-09 PROCEDURE — ? LIQUID NITROGEN

## 2025-06-09 PROCEDURE — ? PRESCRIPTION

## 2025-06-09 PROCEDURE — ? PRESCRIPTION MEDICATION MANAGEMENT

## 2025-06-09 PROCEDURE — ? COUNSELING

## 2025-06-09 RX ORDER — AMMONIUM LACTATE 12 %
LOTION (GRAM) TOPICAL
Qty: 225 | Refills: 3 | Status: ERX | COMMUNITY
Start: 2025-06-09

## 2025-06-09 RX ADMIN — Medication: at 00:00

## 2025-06-09 ASSESSMENT — LOCATION SIMPLE DESCRIPTION DERM
LOCATION SIMPLE: RIGHT FOREARM
LOCATION SIMPLE: LOWER BACK
LOCATION SIMPLE: LEFT UPPER BACK
LOCATION SIMPLE: RIGHT UPPER BACK

## 2025-06-09 ASSESSMENT — LOCATION DETAILED DESCRIPTION DERM
LOCATION DETAILED: LEFT SUPERIOR UPPER BACK
LOCATION DETAILED: RIGHT DISTAL DORSAL FOREARM
LOCATION DETAILED: RIGHT SUPERIOR UPPER BACK
LOCATION DETAILED: SUPERIOR LUMBAR SPINE

## 2025-06-09 ASSESSMENT — LOCATION ZONE DERM
LOCATION ZONE: ARM
LOCATION ZONE: TRUNK

## 2025-06-09 NOTE — HPI: SECONDARY COMPLAINT
Additional History: Patient has a spot of concern on her right arm. She states she thinks she has previously had a SCC here.

## 2025-06-09 NOTE — PROCEDURE: PRESCRIPTION MEDICATION MANAGEMENT
Initiate Treatment: AmLactin 12 % lotion Apply AA to back twice daily.
Render In Strict Bullet Format?: No
Detail Level: Zone
Plan: Discussed sending in prescription of AmLactin. Patient defers prescription noting she doesn’t have any itch.

## 2025-06-09 NOTE — HPI: RASH
What Type Of Note Output Would You Prefer (Optional)?: Bullet Format
Is This A New Presentation, Or A Follow-Up?: Rash
Additional History: Pt has an itchy rash present for 2-3 years, she notes she hasn’t tried anything for it. She denies history of eczema or psoriasis.

## 2025-06-17 ENCOUNTER — TELEPHONE (OUTPATIENT)
Dept: PULMONOLOGY | Age: 80
End: 2025-06-17

## 2025-06-17 NOTE — TELEPHONE ENCOUNTER
Patient called the office stating she doesn't want to start the Dupixent at this time. She has Macular Degeneration in her eyes doesn't feel like this would be a good fit for her right now. I have tried calling her but call will not connect.

## 2025-06-18 NOTE — TELEPHONE ENCOUNTER
Tried calling patient on 3 different occasions, call will not connect. Will wait for patient to call back.

## 2025-06-26 ENCOUNTER — TELEPHONE (OUTPATIENT)
Dept: PULMONOLOGY | Age: 80
End: 2025-06-26

## 2025-06-26 NOTE — TELEPHONE ENCOUNTER
The patient said she has been playing phone tag with your, can you please call her today. Thanks

## 2025-06-27 ENCOUNTER — TELEPHONE (OUTPATIENT)
Dept: FAMILY MEDICINE CLINIC | Facility: CLINIC | Age: 80
End: 2025-06-27

## 2025-06-27 NOTE — TELEPHONE ENCOUNTER
Patient called regarding wanting wanting to leave a message for Dr. ALEXANDER.    Patient stated that she was put on oxygen on 04/06 at [facility] the doctor that saw her and prescribed this to be delivered to her home.    PT no longer wants to have this prescription and was told that in order to stop the oxygen being delivered to her home Dr. ALEXANDER needs to fill out a form. Patient stated that the doctor that prescribed this at [place] no longer fills these forms out and was told to contact her PCP office.    [facility] [employer]

## 2025-06-30 ENCOUNTER — HOSPITAL ENCOUNTER (OUTPATIENT)
Dept: MRI IMAGING | Age: 80
Discharge: HOME | End: 2025-07-03
Attending: INTERNAL MEDICINE
Payer: MEDICARE

## 2025-06-30 DIAGNOSIS — M54.41 CHRONIC BILATERAL LOW BACK PAIN WITH RIGHT-SIDED SCIATICA: ICD-10-CM

## 2025-06-30 DIAGNOSIS — G89.29 CHRONIC BILATERAL LOW BACK PAIN WITH RIGHT-SIDED SCIATICA: ICD-10-CM

## 2025-06-30 DIAGNOSIS — M51.360 DEGENERATION OF INTERVERTEBRAL DISC OF LUMBAR REGION WITH DISCOGENIC BACK PAIN: ICD-10-CM

## 2025-06-30 PROCEDURE — 72148 MRI LUMBAR SPINE W/O DYE: CPT

## 2025-07-01 ENCOUNTER — RESULTS FOLLOW-UP (OUTPATIENT)
Dept: INTERNAL MEDICINE CLINIC | Facility: CLINIC | Age: 80
End: 2025-07-01

## 2025-07-03 NOTE — TELEPHONE ENCOUNTER
Patient states she feels much better since taking the loading dose of Dupixent. She states that she has spoken with her eye doctor and she states that she should take the injections. She has decided to go ahead and get them. Script has been forward to [facility] per [place]. Patient has been informed.

## 2025-07-07 NOTE — TELEPHONE ENCOUNTER
----- Message from Dr. Lalito Solis MD sent at 7/1/2025  4:37 PM EDT -----  Please call patient that her MRI showed \"There is worsening right subarticular recess narrowing at L5-S1 due to bulging of the disc, with likely mild contact between the disc and right S1 nerve root. In the setting of described right-sided sciatica, correlation for  evolving right S1 distribution symptoms is recommended.\"  - she should discuss further with her spine specialist in [place].

## 2025-07-07 NOTE — TELEPHONE ENCOUNTER
Patient notified about results and verbalized understanding      Patient will contact Dr. Vazquez in [place]  to schedule an appointment and discuss further

## 2025-07-11 PROBLEM — K21.9 GASTROESOPHAGEAL REFLUX DISEASE: Status: ACTIVE | Noted: 2025-05-28

## 2025-07-14 ENCOUNTER — TELEPHONE (OUTPATIENT)
Dept: FAMILY MEDICINE CLINIC | Facility: CLINIC | Age: 80
End: 2025-07-14

## 2025-07-14 NOTE — TELEPHONE ENCOUNTER
FYI:     Patient called into the office stating that she was suppose to get a colonoscopy done on Wednesday and she is not getting it done because she is not having any issues and that she is almost 81 and does not have a reason to get it done. Patient stated that her Abdomen and stomach is perfectly fine.     Patient stated that she is having issues with her eye and cataracts and may need surgery and she is dealing with that this week and she feels that her eyes are more important than having a colonoscopy.

## 2025-07-23 ENCOUNTER — OFFICE VISIT (OUTPATIENT)
Dept: INTERNAL MEDICINE CLINIC | Facility: CLINIC | Age: 80
End: 2025-07-23
Payer: MEDICARE

## 2025-07-23 VITALS
SYSTOLIC BLOOD PRESSURE: 152 MMHG | HEART RATE: 79 BPM | DIASTOLIC BLOOD PRESSURE: 59 MMHG | BODY MASS INDEX: 32.74 KG/M2 | TEMPERATURE: 97.3 F | HEIGHT: 61 IN | WEIGHT: 173.4 LBS

## 2025-07-23 DIAGNOSIS — M85.80 OSTEOPENIA, UNSPECIFIED LOCATION: ICD-10-CM

## 2025-07-23 DIAGNOSIS — K92.1 BLACK STOOLS: Primary | ICD-10-CM

## 2025-07-23 PROCEDURE — 99214 OFFICE O/P EST MOD 30 MIN: CPT | Performed by: INTERNAL MEDICINE

## 2025-07-23 PROCEDURE — 1159F MED LIST DOCD IN RCRD: CPT | Performed by: INTERNAL MEDICINE

## 2025-07-23 PROCEDURE — 1036F TOBACCO NON-USER: CPT | Performed by: INTERNAL MEDICINE

## 2025-07-23 PROCEDURE — 1123F ACP DISCUSS/DSCN MKR DOCD: CPT | Performed by: INTERNAL MEDICINE

## 2025-07-23 PROCEDURE — G8417 CALC BMI ABV UP PARAM F/U: HCPCS | Performed by: INTERNAL MEDICINE

## 2025-07-23 PROCEDURE — 1090F PRES/ABSN URINE INCON ASSESS: CPT | Performed by: INTERNAL MEDICINE

## 2025-07-23 PROCEDURE — G8399 PT W/DXA RESULTS DOCUMENT: HCPCS | Performed by: INTERNAL MEDICINE

## 2025-07-23 PROCEDURE — G8427 DOCREV CUR MEDS BY ELIG CLIN: HCPCS | Performed by: INTERNAL MEDICINE

## 2025-07-23 PROCEDURE — G2211 COMPLEX E/M VISIT ADD ON: HCPCS | Performed by: INTERNAL MEDICINE

## 2025-07-23 NOTE — PROGRESS NOTES
[facility]  [address] [place] [address]  Phone: [phone]  Fax: [phone]      Problem Based Overview with Integrated Assessment and Plan  Genevieve Howard Ethan    History of Present Illness  The patient presents for evaluation of black stools, back pain, and macular degeneration.    Black Stools  - She reports recurrent black stools and is scheduled for a colonoscopy next week but fears sedation.  - She is considering alternative diagnostic methods such as a liquid biopsy.  - She believes her symptoms may be related to discontinuing Prilosec two months ago, which she had taken for 25 years.  - She stopped due to concerns about kidney damage.  - She now uses soda water and occasionally Pepcid for heartburn and is mindful of her diet.  - She also experiences morning nausea and diarrhea.  - She discontinued Pepto-Bismol in 11/2024.    Back Pain  - She has pain in her right hip and leg, affecting her gait.  - She consulted a spine specialist in [place] and is awaiting an appointment with Dr. Charles [facility].    Macular Degeneration  - She visited an ophthalmologist on 07/03/2025 for a routine eye checkup after 25 years.  - Diagnosed with macular degeneration and cataracts.  - Prescribed new glasses, which are not very effective.  - More concerned about back pain than vision loss.  - Her mother had macular degeneration and was blind before she passed away.    She was previously prescribed vitamin D supplements due to low levels.    Social History:  Diet: Mindful of her diet.    PAST SURGICAL HISTORY:  Breast surgery  Back surgery  Double knee surgery  Ovarian cancer surgery (5 or 6 years ago)  Gallbladder removal    FAMILY HISTORY  - Mother had macular degeneration and was blind before she passed away       Assessment & Plan  1. Black stools.  - Recurrent black stools, nausea, and diarrhea.  - Off Prilosec for two months; using

## 2025-07-24 ENCOUNTER — TELEPHONE (OUTPATIENT)
Dept: FAMILY MEDICINE CLINIC | Facility: CLINIC | Age: 80
End: 2025-07-24

## 2025-07-24 DIAGNOSIS — K92.1 BLACK STOOL: Primary | ICD-10-CM

## 2025-07-24 NOTE — TELEPHONE ENCOUNTER
Patient called regarding her recent appointment - Patient stated that she was going to one of the lab draw sites and was going to take a stool sample  there but the  would need an order.    Pt stated that her stool sample was black and is wanting to get it tested    -Please Advise

## 2025-07-25 DIAGNOSIS — K92.1 BLACK STOOL: ICD-10-CM

## 2025-07-25 DIAGNOSIS — K92.1 BLACK STOOLS: ICD-10-CM

## 2025-07-25 DIAGNOSIS — M85.80 OSTEOPENIA, UNSPECIFIED LOCATION: ICD-10-CM

## 2025-07-25 LAB
25(OH)D3 SERPL-MCNC: 47.1 NG/ML (ref 30–100)
BASOPHILS # BLD: 0.03 K/UL (ref 0–0.2)
BASOPHILS NFR BLD: 0.6 % (ref 0–2)
DIFFERENTIAL METHOD BLD: ABNORMAL
EOSINOPHIL # BLD: 0.43 K/UL (ref 0–0.8)
EOSINOPHIL NFR BLD: 8 % (ref 0.5–7.8)
ERYTHROCYTE [DISTWIDTH] IN BLOOD BY AUTOMATED COUNT: 14.4 % (ref 11.9–14.6)
HCT VFR BLD AUTO: 35 % (ref 35.8–46.3)
HEMOCCULT STL QL: NEGATIVE
HGB BLD-MCNC: 11.1 G/DL (ref 11.7–15.4)
IMM GRANULOCYTES # BLD AUTO: 0 K/UL (ref 0–0.5)
IMM GRANULOCYTES NFR BLD AUTO: 0 % (ref 0–5)
IRON SATN MFR SERPL: 9 % (ref 20–50)
IRON SERPL-MCNC: 37 UG/DL (ref 35–100)
LYMPHOCYTES # BLD: 2.09 K/UL (ref 0.5–4.6)
LYMPHOCYTES NFR BLD: 38.9 % (ref 13–44)
MCH RBC QN AUTO: 27.6 PG (ref 26.1–32.9)
MCHC RBC AUTO-ENTMCNC: 31.7 G/DL (ref 31.4–35)
MCV RBC AUTO: 87.1 FL (ref 82–102)
MONOCYTES # BLD: 0.46 K/UL (ref 0.1–1.3)
MONOCYTES NFR BLD: 8.6 % (ref 4–12)
NEUTS SEG # BLD: 2.36 K/UL (ref 1.7–8.2)
NEUTS SEG NFR BLD: 43.9 % (ref 43–78)
NRBC # BLD: 0 K/UL (ref 0–0.2)
PLATELET # BLD AUTO: 253 K/UL (ref 150–450)
PMV BLD AUTO: 11 FL (ref 9.4–12.3)
RBC # BLD AUTO: 4.02 M/UL (ref 4.05–5.2)
TIBC SERPL-MCNC: 406 UG/DL (ref 240–450)
UIBC SERPL-MCNC: 369 UG/DL (ref 112–347)
WBC # BLD AUTO: 5.4 K/UL (ref 4.3–11.1)